# Patient Record
Sex: MALE | Race: WHITE
[De-identification: names, ages, dates, MRNs, and addresses within clinical notes are randomized per-mention and may not be internally consistent; named-entity substitution may affect disease eponyms.]

---

## 2017-02-27 ENCOUNTER — HOSPITAL ENCOUNTER (INPATIENT)
Dept: HOSPITAL 74 - YASAS | Age: 47
LOS: 4 days | Discharge: HOME | End: 2017-03-03
Attending: INTERNAL MEDICINE | Admitting: INTERNAL MEDICINE
Payer: COMMERCIAL

## 2017-02-27 VITALS — BODY MASS INDEX: 33.1 KG/M2

## 2017-02-27 DIAGNOSIS — S09.8XXA: ICD-10-CM

## 2017-02-27 DIAGNOSIS — E78.5: ICD-10-CM
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
## 2017-02-27 DIAGNOSIS — M15.9: ICD-10-CM

## 2017-02-27 DIAGNOSIS — F31.81: ICD-10-CM

## 2017-02-27 DIAGNOSIS — S30.0XXA: ICD-10-CM

## 2017-02-27 DIAGNOSIS — Z86.69: ICD-10-CM
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
## 2017-02-27 DIAGNOSIS — F10.230: Primary | ICD-10-CM

## 2017-02-27 DIAGNOSIS — F17.213: ICD-10-CM

## 2017-02-27 LAB
APPEARANCE UR: CLEAR
BILIRUB UR STRIP.AUTO-MCNC: NEGATIVE MG/DL
COLOR UR: (no result)
KETONES UR QL STRIP: NEGATIVE
LEUKOCYTE ESTERASE UR QL STRIP.AUTO: NEGATIVE
NITRITE UR QL STRIP: NEGATIVE
PH UR: 5 [PH] (ref 5–8)
PROT UR QL STRIP: NEGATIVE
PROT UR QL STRIP: NEGATIVE
RBC # UR STRIP: NEGATIVE /UL
SP GR UR: 1.02 (ref 1–1.03)
UROBILINOGEN UR STRIP-MCNC: (no result) E.U./DL (ref 0.2–1)

## 2017-02-27 RX ADMIN — ANALGESIC BALM SCH: 1.74; 4.06 OINTMENT TOPICAL at 22:28

## 2017-02-27 RX ADMIN — LIDOCAINE SCH PATCH: 50 PATCH TOPICAL at 13:44

## 2017-02-27 RX ADMIN — Medication SCH MG: at 22:28

## 2017-02-27 NOTE — CONSULT
[facility] Psychiatric Consult





- Data


Date of interview: 02/27/17


Admission source: [facility]


Identifying data: Another admission to [facility] for this 46 y/o  male 

seeking detox treatment on [address] for alcohol dependence.Patient is single,a 

father of four,domiciled,unemployed and supported on SSI benefits.


Substance Abuse History: - Smoking Cessation.  Smoking history: Current every 

day smoker.  Have you smoked in the past 12 months: Yes.  Aproximately how many 

cigarettes per day: 5.  Cigars Per Day: 0.  Hx Chewing Tobacco Use: No.  

Initiated information on smoking cessation: Yes.  'Breaking Loose' booklet given

: 02/27/17.  - Substance & Tx. History.  Hx Alcohol Use: Yes.  Substance Use 

Type: Alcohol.  Hx Substance Use Treatment: Yes.  - Substances Abused.  ** 

Alcohol-vodka/beer.  Route: Oral.  Frequency: Daily.  Amount used: 4-5 pts./1-6 

pk.  Age of first use: 12.  Date of Last Use: 02/27/17.  Confirmed by the 

patient in my interview.


Medical History: Hypercholesterolemia,arthritis and alcohol induced seizures (

not recent).Noted report of past treatment for syphilis at age 18.


Psychiatric History: Psychiatric history remains unchanged since our last 

encounter in 11/2016.History as follows : diagnosed with Bipolar Disorder;

history of two psychiatric hospitalizations ([facility] and [facility]).Onset of 

psychiatric disturbances in 2007.Mr Small gets his psychiatric outpatient 

services at [facility].Maintained on a regimen of seroquel 400 mg/hs (taken two 

days ago as per self-report).No reported history of suicide attempts.


Physical/Sexual Abuse/Trauma History: Patient denies.


Additional Comment: Urine Drug Screen Results: BZO-Benzodiazepines.Noted.





Mental Status Exam





- Mental Status Exam


Alert and Oriented to: Time, Place, Person


Cognitive Function: Grossly Intact


Patient Appearance: Well Groomed (obese)


Mood: Nervous, Withdrawn


Affect: Mood Congruent


Patient Behavior: Sedated (moderately), Fatigued


Speech Pattern: Clear, Delayed


Voice Loudness: Moderately Soft/Quiet


Thought Process: Goal Oriented


Thought Disorder: Not Present


Hallucinations: Denies


Suicidal Ideation: Denies


Homicidal Ideation: Denies


Insight/Judgement: Poor


Sleep: Fair


Appetite: Good


Muscle strength/Tone: Normal


Gait/Station: Normal





Psychiatric Findings





- Problem List (Axis 1, 2,3)


(1) Alcohol dependence with withdrawal, uncomplicated


Current Visit: Yes   Status: Chronic





(2) Nicotine dependence


Current Visit: Yes   Status: Acute   Qualifiers: 


     Nicotine product type: cigarettes     Substance use status: uncomplicated 

    Qualified Code(s): F17.210 - Nicotine dependence, cigarettes, uncomplicated

  





(3) Bipolar II disorder


Current Visit: Yes   Status: Chronic





(4) Substance induced mood disorder


Current Visit: Yes   Status: Acute





(5) Hyperlipidemia


Current Visit: Yes   Status: Chronic   Qualifiers: 


     Hyperlipidemia type: unspecified        Qualified Code(s): E78.5 - 

Hyperlipidemia, unspecified  








- Initial Treatment Plan


Initial Treatment Plan: Psychoeducation.Detoxification.Medications : zoloft 100 

mg po daily + seroquel 300 mg (reduced) po hs.Side effects/benefits discussed 

with this patient.Made aware of risk of EPS (akathisia,dystonias,dyskinesias),

neuroleptic malignant syndrome,metabolic syndrome (seroquel),sexual dysfunction,

suicidal ideation (zoloft).Patient agrees to continue care on these drugs in 

spite of explained risks.Observation.

## 2017-02-27 NOTE — HP
CIWA Score





- CIWA Score


Nausea/Vomitin-No Nausea/No Vomiting


Muscle Tremors: 4-Moderate,w/Arms Extend


Anxiety: 3


Agitation: 4-Moderately Restless


Paroxysmal Sweats: 3


Orientation: 0-Oriented


Tacttile Disturbances: 0-None


Auditory Disturbances: 0-None


Visual Disturbances: 0-None


Headache: 1-Very Mild


CIWA-Ar Total Score: 15





Admission ROS [facility]





- HPI


Chief Complaint: 


I am here for detox.


Allergies/Adverse Reactions: 


 Allergies











Allergy/AdvReac Type Severity Reaction Status Date / Time


 


No Known Allergies Allergy   Verified 17 09:51











History of Present Illness: 


pt is a 47yr old male with a long history of alcohol dependence seeking detox 

for treatment. 


Exam Limitations: Intoxication





- Ebola screening


Have you traveled outside of the country in the last 21 days: No


Have you had contact with anyone from an Ebola affected area: No


Have you been sick,other than usual withdrawal symptoms: No


Do you have a fever: No





- Review of Systems


Constitutional: Chills, Diaphoresis, Loss of Appetite, Night Sweats, Changes in 

sleep


EENT: reports: Tearing, Nose Congestion


Respiratory: reports: Cough, Productive cough (greenish/yellowish)


Cardiac: reports: Syncope


GI: reports: Nausea, Poor Appetite, Poor Fluid Intake, Indigestion


: reports: No Symptoms Reported


Musculoskeletal: reports: Back Pain


Integumentary: reports: Bruising (ecchymosis to right side of buttock d/t fall 

4 days ago), Flushing, Sweating


Neuro: reports: Headache, Seizure (last seizure three yrs ago), Tingling, 

Tremors


Endocrine: reports: Flushing, Intolerance to Cold, Intolerance to Heat


Hematology: reports: No Symptoms Reported


Psychiatric: reports: Judgement Intact, Mood/Affect Appropiate, Orientated x3, 

Agitated, Anxious


Other Systems: Reviewed and Negative





Patient History





- Patient Medical History


Hx Anemia: No


Hx Asthma: No


Hx Chronic Obstructive Pulmonary Disease (COPD): No


Hx Cancer: No


Hx Cardiac Disorders: No


Hx Congestive Heart Failure: No


Hx Hypertension: No


Hx Hypercholesterolemia: Yes (NO MEDS)


Hx Pacemaker: No


HX Cerebrovascular Accident: No


Hx Seizures: Yes (alcohol related-last episode was in )


Hx Dementia: No


Hx Diabetes: No


Hx Gastrointestinal Disorders: No


Hx Liver Disease: No


Hx Genitourinary Disorders: No


Hx Sexually Transmitted Disorders: Yes (syphilis at age 18)


Hx Renal Disease (ESRD): No


Hx Thyroid Disease: No


Hx Human Immunodeficiency Virus (HIV): No (NEGATIVE HX  last)


Hx Hepatitis C: No


Hx Depression: Yes


Hx Suicide Attempt: No


Hx Bipolar Disorder: Yes (on Seroquel and Zoloft as per Hx.)


Hx Schizophrenia: No





- Patient Surgical History


Past Surgical History: No


Hx Neurologic Surgery: No


Hx Cataract Extraction: No


Hx Cardiac Surgery: No


Hx Lung Surgery: No


Hx Breast Surgery: No


Hx Breast Biopsy: No


Hx Abdominal Surgery: No


Hx Appendectomy: No


Hx Cholecystectomy: No


Hx Genitourinary Surgery: No


Hx  Section: No


Hx Orthopedic Surgery: No


Anesthesia Reaction: No





- PPD History


Previous Implant?: Yes


Documented Results: Positive w/o proof


Results: CXR neg 


PPD to be Administered?: No





- Reproductive History


Patient is a Female of Child Bearing Age (11 -55 yrs old): No





- Smoking Cessation


Smoking history: Current every day smoker


Have you smoked in the past 12 months: Yes


Aproximately how many cigarettes per day: 5


Cigars Per Day: 0


Hx Chewing Tobacco Use: No


Initiated information on smoking cessation: Yes


'Breaking Loose' booklet given: 17





- Substance & Tx. History


Hx Alcohol Use: Yes


Substance Use Type: Alcohol


Hx Substance Use Treatment: Yes





- Substances Abused


  ** Alcohol-vodka/beer


Route: Oral


Frequency: Daily


Amount used: 4-5 pts./1-6 pk.


Age of first use: 12


Date of Last Use: 17





Family Disease History





- Family Disease History


Family Disease History: Diabetes: Mother (HTN), Other: Father (alcohol,)

, Mother





Admission Physical Exam [facility]





- Vital Signs


Vital Signs: 


 Vital Signs - 24 hr











  17





  09:58


 


Temperature 97 F L


 


Pulse Rate 112 H


 


Respiratory 19





Rate 


 


Blood Pressure 136/86














- Physical


General Appearance: Yes: Appropriately Dressed, Moderate Distress, Tremorous, 

Irritable, Sweating, Anxious


HEENTM: Yes: Hearing grossly Normal, Nasal Congestion, Rhinorrhea


Respiratory: Yes: Lungs Clear, Normal Breath Sounds, No Respiratory Distress


Neck: Yes: No masses,lesions,Nodules


Breast: Yes: Within Normal Limits


Cardiology: Yes: Regular Rhythm, Regular Rate, S1, S2


Abdominal: Yes: Normal Bowel Sounds


Genitourinary: Yes: Within Normal Limits


Back: Yes: Normal Inspection


Musculoskeletal: Yes: Back pain


Extremities: Yes: Normal Inspection, Tremors


Neurological: Yes: Fully Oriented, Alert, Normal Response


Integumentary: Yes: Diaphoresis, Other (bruising with eccymosis to right side 

of buttock d/t fall)


Lymphatic: Yes: Within Normal Limits





- Diagnostic


(1) Alcohol dependence with withdrawal, uncomplicated


Current Visit: Yes   Status: Chronic





(2) Nicotine dependence


Current Visit: Yes   Status: Chronic   Qualifiers: 


     Nicotine product type: cigarettes     Substance use status: uncomplicated 

    Qualified Code(s): F17.210 - Nicotine dependence, cigarettes, uncomplicated

  





(3) Alcohol related seizure


Current Visit: No   Status: Inactive





(4) Hyperlipidemia


Current Visit: Yes   Status: Chronic   Qualifiers: 


     Hyperlipidemia type: unspecified        Qualified Code(s): E78.5 - 

Hyperlipidemia, unspecified  








Cleared for Admission [facility]





- Detox or Rehab


[facility] Level of Care: Medically Managed


Detox Regimen/Protocol: Librium





[facility] Breath Alcohol Content


Breath Alcohol Content: 0.189





Urine Drug Screen





- Results


Drug Screen Negative: No


Urine Drug Screen Results: BZO-Benzodiazepines

## 2017-02-28 LAB
ALBUMIN SERPL-MCNC: 3.7 G/DL (ref 3.4–5)
ALP SERPL-CCNC: 94 U/L (ref 45–117)
ALT SERPL-CCNC: 79 U/L (ref 12–78)
ANION GAP SERPL CALC-SCNC: 8 MMOL/L (ref 8–16)
AST SERPL-CCNC: 85 U/L (ref 15–37)
BILIRUB SERPL-MCNC: 0.6 MG/DL (ref 0.2–1)
CALCIUM SERPL-MCNC: 8.2 MG/DL (ref 8.5–10.1)
CO2 SERPL-SCNC: 27 MMOL/L (ref 21–32)
CREAT SERPL-MCNC: 0.8 MG/DL (ref 0.7–1.3)
DEPRECATED RDW RBC AUTO: 13.8 % (ref 11.9–15.9)
GLUCOSE SERPL-MCNC: 134 MG/DL (ref 74–106)
MCH RBC QN AUTO: 35 PG (ref 25.7–33.7)
MCHC RBC AUTO-ENTMCNC: 33.5 G/DL (ref 32–35.9)
MCV RBC: 104.4 FL (ref 80–96)
PLATELET # BLD AUTO: 231 K/MM3 (ref 134–434)
PMV BLD: 8.1 FL (ref 7.5–11.1)
PROT SERPL-MCNC: 7.1 G/DL (ref 6.4–8.2)
WBC # BLD AUTO: 6.4 K/MM3 (ref 4–10)

## 2017-02-28 RX ADMIN — SERTRALINE HYDROCHLORIDE SCH MG: 50 TABLET ORAL at 10:27

## 2017-02-28 RX ADMIN — Medication SCH TAB: at 10:27

## 2017-02-28 RX ADMIN — Medication SCH MG: at 22:32

## 2017-02-28 RX ADMIN — GUAIFENESIN AND DEXTROMETHORPHAN PRN ML: 100; 10 SYRUP ORAL at 00:57

## 2017-02-28 RX ADMIN — ANALGESIC BALM SCH: 1.74; 4.06 OINTMENT TOPICAL at 22:33

## 2017-02-28 RX ADMIN — ANALGESIC BALM SCH APPLIC: 1.74; 4.06 OINTMENT TOPICAL at 10:27

## 2017-02-28 RX ADMIN — LIDOCAINE SCH PATCH: 50 PATCH TOPICAL at 10:26

## 2017-02-28 RX ADMIN — GUAIFENESIN AND DEXTROMETHORPHAN PRN ML: 100; 10 SYRUP ORAL at 17:34

## 2017-02-28 NOTE — EKG
Test Reason : 

Blood Pressure : ***/*** mmHG

Vent. Rate : 090 BPM     Atrial Rate : 090 BPM

   P-R Int : 182 ms          QRS Dur : 090 ms

    QT Int : 380 ms       P-R-T Axes : 041 029 021 degrees

   QTc Int : 464 ms

 

NORMAL SINUS RHYTHM

NORMAL ECG

WHEN COMPARED WITH ECG OF 15-APR-2016 10:50,

NO SIGNIFICANT CHANGE WAS FOUND

Confirmed by LILIA NAGEL MD ([id number]) on 2/28/2017 3:35:17 PM

 

Referred By: Mono Wing           Confirmed By:LILIA NAGEL MD

## 2017-02-28 NOTE — PN
[facility] CIWA





- CIWA Score


Nausea/Vomitin-No Nausea/No Vomiting


Muscle Tremors: 4-Moderate,w/Arms Extend


Anxiety: 4-Mod. Anxious/Guarded


Agitation: 4-Moderately Restless


Paroxysmal Sweats: 1-Minimal Palms Moist


Orientation: 0-Oriented


Tacttile Disturbances: 3-Moderate Itch/Numb/Burn


Auditory Disturbances: 0-None


Visual Disturbances: 0-None


Headache: 0-None Present


CIWA-Ar Total Score: 16





[facility] Progress Note (SOAP)


Subjective: 


ANXIETY,TREMORS,SWEATS,INTERMITTENT SLEEP


Objective: 





17 10:45


 Vital Signs











Temperature  97 F L  17 09:58


 


Pulse Rate  110 H  17 09:58


 


Respiratory Rate  18   17 09:58


 


Blood Pressure  127/85   17 09:58


 


O2 Sat by Pulse Oximetry (%)      








 Laboratory Last Values











WBC  6.4 K/mm3 (4.0-10.0)  D 17  06:00    


 


RBC  3.33 M/mm3 (4.00-5.60)  L D 17  06:00    


 


Hgb  11.6 GM/dL (11.7-16.9)  L D 17  06:00    


 


Hct  34.7 % (35.4-49)  L D 17  06:00    


 


MCV  104.4 fl (80-96)  H  17  06:00    


 


MCHC  33.5 g/dl (32.0-35.9)   17  06:00    


 


RDW  13.8 % (11.9-15.9)   17  06:00    


 


Plt Count  231 K/MM3 (134-434)  D 17  06:00    


 


MPV  8.1 fl (7.5-11.1)   17  06:00    


 


Sodium  140 mmol/L (136-145)   17  06:00    


 


Potassium  3.6 mmol/L (3.5-5.1)   17  06:00    


 


Chloride  105 mmol/L ()   17  06:00    


 


Carbon Dioxide  27 mmol/L (21-32)   17  06:00    


 


Anion Gap  8  (8-16)   17  06:00    


 


BUN  7 mg/dL (7-18)  D 17  06:00    


 


Creatinine  0.8 mg/dL (0.7-1.3)   17  06:00    


 


Creat Clearance w eGFR  > 60  (>60)   17  06:00    


 


Random Glucose  134 mg/dL ()  H  17  06:00    


 


Calcium  8.2 mg/dL (8.5-10.1)  L  17  06:00    


 


Total Bilirubin  0.6 mg/dL (0.2-1.0)  D 17  06:00    


 


AST  85 U/L (15-37)  H D 17  06:00    


 


ALT  79 U/L (12-78)  H D 17  06:00    


 


Alkaline Phosphatase  94 U/L ()   17  06:00    


 


Total Protein  7.1 g/dl (6.4-8.2)   17  06:00    


 


Albumin  3.7 g/dl (3.4-5.0)   17  06:00    


 


Urine Color  Caitlyn   17  13:55    


 


Urine Appearance  Clear   17  13:55    


 


Urine pH  5.0  (5.0-8.0)   17  13:55    


 


Ur Specific Gravity  1.024  (1.001-1.035)   17  13:55    


 


Urine Protein  Negative  (NEGATIVE)   17  13:55    


 


Urine Glucose (UA)  Negative  (NEGATIVE)   17  13:55    


 


Urine Ketones  Negative  (NEGATIVE)   17  13:55    


 


Urine Blood  Negative  (NEGATIVE)   17  13:55    


 


Urine Nitrite  Negative  (NEGATIVE)   17  13:55    


 


Urine Bilirubin  Negative  (NEGATIVE)   17  13:55    


 


Urine Urobilinogen  4.0 e.u/dl E.U./dl (0.2-1.0)   17  13:55    


 


Ur Leukocyte Esterase  Negative  (NEGATIVE)   17  13:55    


 


Hepatitis C Antibody  <0.1 s/co ratio (0.0-0.9)   17  12:00    











Assessment: 





17 10:45


WITHDRAWAL SX


Plan: 


CONTINUE DETOX

## 2017-03-01 RX ADMIN — Medication SCH TAB: at 10:33

## 2017-03-01 RX ADMIN — Medication SCH MG: at 22:31

## 2017-03-01 RX ADMIN — ANALGESIC BALM SCH: 1.74; 4.06 OINTMENT TOPICAL at 10:33

## 2017-03-01 RX ADMIN — SERTRALINE HYDROCHLORIDE SCH MG: 50 TABLET ORAL at 10:33

## 2017-03-01 RX ADMIN — LIDOCAINE SCH PATCH: 50 PATCH TOPICAL at 10:34

## 2017-03-01 RX ADMIN — ANALGESIC BALM SCH: 1.74; 4.06 OINTMENT TOPICAL at 22:34

## 2017-03-01 NOTE — PN
[facility] CIWA





- CIWA Score


Nausea/Vomitin-No Nausea/No Vomiting


Muscle Tremors: 4-Moderate,w/Arms Extend


Anxiety: 4-Mod. Anxious/Guarded


Agitation: 4-Moderately Restless


Paroxysmal Sweats: 1-Minimal Palms Moist


Orientation: 0-Oriented


Tacttile Disturbances: 3-Moderate Itch/Numb/Burn


Auditory Disturbances: 0-None


Visual Disturbances: 0-None


Headache: 0-None Present


CIWA-Ar Total Score: 16





[facility] Progress Note (SOAP)


Subjective: 


ANXIETY,SWEATS,FATIGUE.


Objective: 





17 11:54


 Vital Signs











Temperature  98.3 F   17 09:48


 


Pulse Rate  89   17 09:48


 


Respiratory Rate  18   17 09:48


 


Blood Pressure  123/93   17 09:48


 


O2 Sat by Pulse Oximetry (%)      








 Laboratory Last Values











WBC  6.4 K/mm3 (4.0-10.0)  D 17  06:00    


 


RBC  3.33 M/mm3 (4.00-5.60)  L D 17  06:00    


 


Hgb  11.6 GM/dL (11.7-16.9)  L D 17  06:00    


 


Hct  34.7 % (35.4-49)  L D 17  06:00    


 


MCV  104.4 fl (80-96)  H  17  06:00    


 


MCHC  33.5 g/dl (32.0-35.9)   17  06:00    


 


RDW  13.8 % (11.9-15.9)   17  06:00    


 


Plt Count  231 K/MM3 (134-434)  D 17  06:00    


 


MPV  8.1 fl (7.5-11.1)   17  06:00    


 


Sodium  140 mmol/L (136-145)   17  06:00    


 


Potassium  3.6 mmol/L (3.5-5.1)   17  06:00    


 


Chloride  105 mmol/L ()   17  06:00    


 


Carbon Dioxide  27 mmol/L (21-32)   17  06:00    


 


Anion Gap  8  (8-16)   17  06:00    


 


BUN  7 mg/dL (7-18)  D 17  06:00    


 


Creatinine  0.8 mg/dL (0.7-1.3)   17  06:00    


 


Creat Clearance w eGFR  > 60  (>60)   17  06:00    


 


Random Glucose  134 mg/dL ()  H  17  06:00    


 


Calcium  8.2 mg/dL (8.5-10.1)  L  17  06:00    


 


Total Bilirubin  0.6 mg/dL (0.2-1.0)  D 17  06:00    


 


AST  85 U/L (15-37)  H D 17  06:00    


 


ALT  79 U/L (12-78)  H D 17  06:00    


 


Alkaline Phosphatase  94 U/L ()   17  06:00    


 


Total Protein  7.1 g/dl (6.4-8.2)   17  06:00    


 


Albumin  3.7 g/dl (3.4-5.0)   17  06:00    


 


Urine Color  Caitlyn   17  13:55    


 


Urine Appearance  Clear   17  13:55    


 


Urine pH  5.0  (5.0-8.0)   17  13:55    


 


Ur Specific Gravity  1.024  (1.001-1.035)   17  13:55    


 


Urine Protein  Negative  (NEGATIVE)   17  13:55    


 


Urine Glucose (UA)  Negative  (NEGATIVE)   17  13:55    


 


Urine Ketones  Negative  (NEGATIVE)   17  13:55    


 


Urine Blood  Negative  (NEGATIVE)   17  13:55    


 


Urine Nitrite  Negative  (NEGATIVE)   17  13:55    


 


Urine Bilirubin  Negative  (NEGATIVE)   17  13:55    


 


Urine Urobilinogen  4.0 e.u/dl E.U./dl (0.2-1.0)   17  13:55    


 


Ur Leukocyte Esterase  Negative  (NEGATIVE)   17  13:55    


 


RPR Titer  Nonreactive  (NONREACTIVE)   17  06:00    


 


Hepatitis C Antibody  <0.1 s/co ratio (0.0-0.9)   17  12:00    











Assessment: 





17 11:55


WITHDRAWAL SX


Plan: 


CONTINUE DETOX

## 2017-03-02 RX ADMIN — Medication SCH TAB: at 10:28

## 2017-03-02 RX ADMIN — ANALGESIC BALM SCH: 1.74; 4.06 OINTMENT TOPICAL at 22:26

## 2017-03-02 RX ADMIN — ANALGESIC BALM SCH: 1.74; 4.06 OINTMENT TOPICAL at 10:28

## 2017-03-02 RX ADMIN — Medication SCH MG: at 22:26

## 2017-03-02 RX ADMIN — SERTRALINE HYDROCHLORIDE SCH MG: 50 TABLET ORAL at 10:28

## 2017-03-02 RX ADMIN — LIDOCAINE SCH PATCH: 50 PATCH TOPICAL at 10:29

## 2017-03-02 RX ADMIN — GUAIFENESIN AND DEXTROMETHORPHAN PRN ML: 100; 10 SYRUP ORAL at 20:11

## 2017-03-02 NOTE — PN
[facility] Progress Note (SOAP)


Subjective: 


DECREASED ANXIETY,SWEATS.DETOX PROCEEDING WELL.


Objective: 





03/02/17 10:02


 Vital Signs











Temperature  95.7 F L  03/02/17 06:11


 


Pulse Rate  67   03/02/17 09:19


 


Respiratory Rate  18   03/02/17 09:19


 


Blood Pressure  123/88   03/02/17 09:19


 


O2 Sat by Pulse Oximetry (%)      











Assessment: 





03/02/17 10:02


DECREASED WITHDRAWAL SX


Plan: 


CONTINUE DETOX.

## 2017-03-03 VITALS — DIASTOLIC BLOOD PRESSURE: 70 MMHG | HEART RATE: 85 BPM | TEMPERATURE: 96 F | SYSTOLIC BLOOD PRESSURE: 105 MMHG

## 2017-03-03 PROCEDURE — HZ2ZZZZ DETOXIFICATION SERVICES FOR SUBSTANCE ABUSE TREATMENT: ICD-10-PCS | Performed by: INTERNAL MEDICINE

## 2017-03-03 RX ADMIN — SERTRALINE HYDROCHLORIDE SCH: 50 TABLET ORAL at 10:07

## 2017-03-03 RX ADMIN — LIDOCAINE SCH: 50 PATCH TOPICAL at 10:07

## 2017-03-03 RX ADMIN — ANALGESIC BALM SCH: 1.74; 4.06 OINTMENT TOPICAL at 10:07

## 2017-03-03 RX ADMIN — Medication SCH: at 10:07

## 2017-03-03 NOTE — DS
[facility] Detox Discharge Summary


Admission Date: 


02/27/17





Discharge Date: 03/03/17





- History


Present History: Alcohol Dependence


Additional Comments: 


DETOX COMPLETED.ALERT O X 3. NAD. FOLLOW UP WITH REHAB REFERRAL/PMD FOR MEDICAL 

MANAGEMENT. 


Pertinent Past History: 


HYPERCHOLESTEROLEMIA


SEIZURE DISORDER


DEPRESSION





- Physical Exam Results


Vital Signs: 


 Vital Signs











Temperature  96.0 F L  03/03/17 06:29


 


Pulse Rate  85   03/03/17 06:29


 


Respiratory Rate  18   03/03/17 06:29


 


Blood Pressure  105/70   03/03/17 06:29


 


O2 Sat by Pulse Oximetry (%)      











Pertinent Admission Physical Exam Findings: 


WITHDRAWAL SX





- Treatment


Hospital Course: Detox Protocol Followed, Detoxed Safely, Responded well, 

Discharged Condition Good





- Medication


Discharge Medications: 


Ambulatory Orders





Sertraline HCl [Zoloft -] 100 mg PO DAILY #30 tablet 04/16/16 


Quetiapine Fumarate [Seroquel -] 400 mg PO HS #30 tablet 06/30/16 


Quetiapine Fumarate [Seroquel -] 400 mg PO HS #30 tab 02/27/17 


Sertraline HCl [Zoloft -] 100 mg PO DAILY #30 tablet 02/27/17 











- Diagnosis


(1) Head injury


Status: Resolved   Qualifiers: 


     Qualified Code(s): S09.90XA - Unspecified injury of head, initial 

encounter  





(2) Nicotine dependence


Status: Acute   Qualifiers: 


     Nicotine product type: cigarettes     Substance use status: in withdrawal 

       Qualified Code(s): F17.213 - Nicotine dependence, cigarettes, with 

withdrawal  





(3) Alcohol dependence with withdrawal, uncomplicated


Status: Acute





(4) Arthritis


Status: Chronic





(5) Hyperlipidemia


Status: Chronic   Qualifiers: 


     Hyperlipidemia type: unspecified        Qualified Code(s): E78.5 - 

Hyperlipidemia, unspecified  





(6) Seizure


Status: Suspected   








- AMA


Did Patient Leave Against Medical Advice: No

## 2017-05-24 ENCOUNTER — HOSPITAL ENCOUNTER (INPATIENT)
Dept: HOSPITAL 74 - YASAS | Age: 47
LOS: 4 days | Discharge: HOME | End: 2017-05-28
Attending: INTERNAL MEDICINE | Admitting: INTERNAL MEDICINE
Payer: COMMERCIAL

## 2017-05-24 VITALS — BODY MASS INDEX: 31.4 KG/M2

## 2017-05-24 DIAGNOSIS — F19.282: ICD-10-CM

## 2017-05-24 DIAGNOSIS — Z87.438: ICD-10-CM

## 2017-05-24 DIAGNOSIS — Z86.69: ICD-10-CM

## 2017-05-24 DIAGNOSIS — D64.9: ICD-10-CM

## 2017-05-24 DIAGNOSIS — E78.5: ICD-10-CM

## 2017-05-24 DIAGNOSIS — M19.90: ICD-10-CM

## 2017-05-24 DIAGNOSIS — F31.81: ICD-10-CM

## 2017-05-24 DIAGNOSIS — F19.24: ICD-10-CM

## 2017-05-24 DIAGNOSIS — F10.230: Primary | ICD-10-CM

## 2017-05-24 DIAGNOSIS — F17.213: ICD-10-CM

## 2017-05-24 DIAGNOSIS — E66.9: ICD-10-CM

## 2017-05-24 DIAGNOSIS — G47.00: ICD-10-CM

## 2017-05-24 DIAGNOSIS — I10: ICD-10-CM

## 2017-05-24 LAB
APPEARANCE UR: CLEAR
BILIRUB UR STRIP.AUTO-MCNC: NEGATIVE MG/DL
COLOR UR: (no result)
KETONES UR QL STRIP: NEGATIVE
LEUKOCYTE ESTERASE UR QL STRIP.AUTO: NEGATIVE
NITRITE UR QL STRIP: NEGATIVE
PH UR: 5 [PH] (ref 5–8)
PROT UR QL STRIP: NEGATIVE
PROT UR QL STRIP: NEGATIVE
RBC # UR STRIP: NEGATIVE /UL
SP GR UR: 1.02 (ref 1–1.02)
UROBILINOGEN UR STRIP-MCNC: NEGATIVE E.U./DL (ref 0.2–1)

## 2017-05-24 PROCEDURE — HZ2ZZZZ DETOXIFICATION SERVICES FOR SUBSTANCE ABUSE TREATMENT: ICD-10-PCS | Performed by: INTERNAL MEDICINE

## 2017-05-24 RX ADMIN — Medication SCH MG: at 22:13

## 2017-05-24 RX ADMIN — NICOTINE SCH: 14 PATCH, EXTENDED RELEASE TRANSDERMAL at 15:45

## 2017-05-24 NOTE — HP
CIWA Score





- CIWA Score


Nausea/Vomitin


Muscle Tremors: 4-Moderate,w/Arms Extend


Anxiety: 4-Mod. Anxious/Guarded


Agitation: 4-Moderately Restless


Paroxysmal Sweats: 1-Minimal Palms Moist


Orientation: 0-Oriented


Tacttile Disturbances: 3-Moderate Itch/Numb/Burn


Auditory Disturbances: 0-None


Visual Disturbances: 0-None


Headache: 1-Very Mild


CIWA-Ar Total Score: 22





Admission ROS [facility]





- HPI


Chief Complaint: 


DETOX TX FOR ALCOHOL DEPENDENCE/ACUT ALCOHOL INTOXICATION


Allergies/Adverse Reactions: 


 Allergies











Allergy/AdvReac Type Severity Reaction Status Date / Time


 


No Known Allergies Allergy   Verified 17 10:27











History of Present Illness: 


48 Y/O H/M WITH A HX OF ALCOHOL DEPENDENCE SEEKING DETOX TX. PT STATES HE WAS 

ON [facility] LAST NIGHT FOR ALCOHOL INTOXICATION, DISCHARGED THIS 

MORNING AND REFERRED TO DETOX/REHAB.


Exam Limitations: No Limitations





- Ebola screening


Have you traveled outside of the country in the last 21 days: No


Have you had contact with anyone from an Ebola affected area: No


Have you been sick,other than usual withdrawal symptoms: No





- Review of Systems


Constitutional: Loss of Appetite, Night Sweats, Changes in sleep


EENT: reports: Blurred Vision, Tearing, Nose Congestion, Dental Problems (

MISSING TEETH)


Respiratory: reports: No Symptoms reported


Cardiac: reports: Lightheadedness


GI: reports: Diarrhea, Nausea, Poor Appetite, Poor Fluid Intake, Vomiting, 

Indigestion, Abdominal cramping


: reports: No Symptoms Reported


Musculoskeletal: reports: Muscle Pain, Other (ARTHRITIS KNEES)


Integumentary: reports: No Symptoms Reported


Neuro: reports: Headache, Seizure (DUE TO ALCOHOL WITHDRAWALS), Tremors, 

Unsteady Gait, Dizziness


Endocrine: reports: No Symptoms Reported


Hematology: reports: Anemia


Psychiatric: reports: Orientated x3, Agitated, Anxious, Depressed


Other Systems: Reviewed and Negative





Patient History





- Patient Medical History


Hx Anemia: Yes (NO CURRENT MED)


Hx Asthma: No


Hx Chronic Obstructive Pulmonary Disease (COPD): No


Hx Cancer: No


Hx Cardiac Disorders: No


Hx Congestive Heart Failure: No


Hx Hypertension: Yes (NO CURRENT MED. LISINOPRIL 5 MG IN 2016 ON PHARM REC.)


Hx Hypercholesterolemia: Yes (NO MEDS)


Hx Pacemaker: No


HX Cerebrovascular Accident: No


Hx Seizures: Yes (alcohol related-last episode was in )


Hx Dementia: No


Hx Diabetes: No


Hx Gastrointestinal Disorders: No


Hx Liver Disease: No


Hx Genitourinary Disorders: No


Hx Sexually Transmitted Disorders: Yes (syphilis)


Hx Renal Disease (ESRD): No


Hx Thyroid Disease: No


Hx Human Immunodeficiency Virus (HIV): No (NEGATIVE HX )


Hx Hepatitis C: No


Hx Depression: Yes


Hx Suicide Attempt: No (DENIES)


Hx Bipolar Disorder: Yes (on Seroquel and Zoloft as per Hx.)


Hx Schizophrenia: No





- Patient Surgical History


Past Surgical History: No


Hx Neurologic Surgery: No


Hx Cataract Extraction: No


Hx Cardiac Surgery: No


Hx Lung Surgery: No


Hx Breast Surgery: No


Hx Breast Biopsy: No


Hx Abdominal Surgery: No


Hx Appendectomy: No


Hx Cholecystectomy: No


Hx Genitourinary Surgery: No


Hx Orthopedic Surgery: No


Anesthesia Reaction: No





- PPD History


Previous Implant?: Yes


Documented Results: Positive w/o proof


Results: CXR neg 


PPD to be Administered?: No





- Reproductive History


Patient is a Female of Child Bearing Age (11 -55 yrs old): No (MALE)





- Smoking Cessation


Smoking history: Current every day smoker


Have you smoked in the past 12 months: Yes


Aproximately how many cigarettes per day: 9


Cigars Per Day: 0


Hx Chewing Tobacco Use: No


Initiated information on smoking cessation: Yes


'Breaking Loose' booklet given: 17





- Substance & Tx. History


Hx Alcohol Use: Yes (VODKA)


Hx Substance Use: No (DECLINED)


Substance Use Type: Alcohol


Hx Substance Use Treatment: Yes ([facility]-DETOX)





- Substances Abused


  ** Alcohol-beer/vodka


Route: Oral


Frequency: Daily


Amount used: 7 (16 oz.)/2-3 pts.


Age of first use: 12


Date of Last Use: 17





Family Disease History





- Family Disease History


Family Disease History: Diabetes: Mother (HTN), Other: Father (alcohol,)

, Mother





Admission Physical Exam [facility]





- Vital Signs


Vital Signs: 


 Vital Signs - 24 hr











  17





  09:55


 


Temperature 97.9 F


 


Pulse Rate 91 H


 


Respiratory 20





Rate 


 


Blood Pressure 123/83














- Physical


General Appearance: Yes: Alcohol on Breath, Intoxicated, Obese, Anxious


HEENTM: Yes: EOMI, Normocephalic, [place], Pharynx Normal, Nasal Congestion, 

Rhinorrhea


Respiratory: Yes: Chest Non-Tender, Lungs Clear, Normal Breath Sounds, No 

Respiratory Distress


Neck: Yes: Supple, Trachea in good position


Breast: Yes: Breast Exam Deferred


Cardiology: Yes: Regular Rhythm, Regular Rate, S1, S2


Abdominal: Yes: Normal Bowel Sounds, Non Tender, Soft


Genitourinary: Yes: Other (N/C)


Back: Yes: Within Normal Limits


Musculoskeletal: Yes: full range of Motion, Gait Steady


Extremities: Yes: Normal Range of Motion, Non-Tender, Tremors, Other (BRUISE ON 

OUTER ASPECT LOWER LEFT LEG DUE TO FALL)


Neurological: Yes: CNs II-XII NML intact, Fully Oriented, Alert, Motor Strength 

5/5


Integumentary: Yes: Dry, Warm


Lymphatic: Yes: Within Normal Limits





- Diagnostic


(1) Alcohol dependence with withdrawal, uncomplicated


Current Visit: Yes   Status: Acute





(2) Nicotine dependence


Current Visit: Yes   Status: Chronic   Qualifiers: 


     Nicotine product type: cigarettes     Substance use status: in withdrawal 

       Qualified Code(s): F17.213 - Nicotine dependence, cigarettes, with 

withdrawal  





(3) Arthritis


Current Visit: Yes   Status: Chronic


Comment: BOTH KNEES AND LEFT WRIST








(4) Seizure


Current Visit: Yes   Status: Suspected   Qualifiers: 


     Convulsion type: unspecified        Qualified Code(s): R56.9 - Unspecified 

convulsions  





(5) Hyperlipidemia


Current Visit: Yes   Status: Suspected   Qualifiers: 


     Hyperlipidemia type: unspecified        Qualified Code(s): E78.5 - 

Hyperlipidemia, unspecified  


Comment: NO CURRENT MED











Cleared for Admission [facility]





- Detox or Rehab


[facility] Level of Care: Medically Managed


Detox Regimen/Protocol: Librium





BHS Breath Alcohol Content


Breath Alcohol Content: 0.245





Urine Drug Screen





- Results


Drug Screen Negative: No


Urine Drug Screen Results: BZO-Benzodiazepines

## 2017-05-24 NOTE — CONSULT
[facility] Psychiatric Consult





- Data


Date of interview: 05/24/17


Admission source: [facility]


Identifying data: Readmission to [facility] for this 46 y/o  male 

seeking detox treatment on [address] for alcohol dependence.Patient is single,a 

father of four,domiciled,unemployed and supported on SSI benefits.


Substance Abuse History: - Smoking Cessation.  Smoking history: Current every 

day smoker.  Have you smoked in the past 12 months: Yes.  Aproximately how many 

cigarettes per day: 9.  Cigars Per Day: 0.  Hx Chewing Tobacco Use: No.  

Initiated information on smoking cessation: Yes.  'Breaking Loose' booklet given

: 05/24/17.  - Substance & Tx. History.  Hx Alcohol Use: Yes (VODKA).  Hx 

Substance Use: No (DECLINED).  Substance Use Type: Alcohol.  Hx Substance Use 

Treatment: Yes ([facility]-DETOX).  - Substances Abused.  ** Alcohol-beer/vodka.  

Route: Oral.  Frequency: Daily.  Amount used: 7 (16 oz.)/2-3 pts.  Age of first 

use: 12.  Date of Last Use: 05/23/17.  Confirmed by patient.


Medical History: Hypercholesterolemia,arthritis and alcohol induced seizures (

not recent).Noted report of past treatment for syphilis (age 18).


Psychiatric History: Diagnosed with Bipolar Disorder;history of two psychiatric 

hospitalizations ([facility] and [facility]).Onset of 

psychiatric disturbances in 2007.Mr Small still gets his psychiatric 

outpatient services at [facility].Maintained on a regimen of seroquel 400 mg/hs (

taken a week ago as per self-report).No reported history of suicide attempts.


Physical/Sexual Abuse/Trauma History: Patient denies.


Additional Comment: Urine Drug Screen Results: BZO-Benzodiazepines.Noted.





Mental Status Exam





- Mental Status Exam


Alert and Oriented to: Time, Place, Person


Cognitive Function: Good


Patient Appearance: Well Groomed


Mood: Nervous, Withdrawn, Anxious


Affect: Mood Congruent, Constricted


Patient Behavior: Fatigued, Cooperative


Speech Pattern: Clear (english-speaking)


Voice Loudness: Normal


Thought Process: Goal Oriented


Thought Disorder: Not Present


Hallucinations: Denies


Suicidal Ideation: Denies


Homicidal Ideation: Denies


Insight/Judgement: Poor


Sleep: Poorly, Difficulty falling asleep


Appetite: Good


Muscle strength/Tone: Normal


Gait/Station: Normal





Psychiatric Findings





- Problem List (Axis 1, 2,3)


(1) Alcohol dependence with withdrawal, uncomplicated


Current Visit: Yes   Status: Acute





(2) Nicotine dependence


Current Visit: Yes   Status: Acute   Qualifiers: 


     Nicotine product type: cigarettes     Substance use status: in withdrawal 

       Qualified Code(s): F17.213 - Nicotine dependence, cigarettes, with 

withdrawal  





(3) Substance induced mood disorder


Current Visit: Yes   Status: Acute





(4) Bipolar II disorder


Current Visit: Yes   Status: Chronic





(5) Arthritis


Current Visit: Yes   Status: Chronic


Comment: BOTH KNEES AND LEFT WRIST








(6) Hyperlipidemia


Current Visit: Yes   Status: Suspected   Qualifiers: 


     Hyperlipidemia type: unspecified        Qualified Code(s): E78.5 - 

Hyperlipidemia, unspecified  


Comment: NO CURRENT MED








(7) Insomnia


Current Visit: Yes   Status: Acute   








- Initial Treatment Plan


Initial Treatment Plan: Psychoeducation.Detoxification in progress.Seroquel 200 

mg po hs (intentionally reduced in view of self-report of non-adherence for one 

week + current polypharmacy).Will titrate back to 400 mg/hs in next 24 hours if 

dose is well tolerated (absence of oversedation/orthostatic hypotension)

.Patient is informed of this strategy.Mr Small is in agreement with 

careplan.Side effects/benefits of seroquel are discussed with the 

patient.Observation.

## 2017-05-25 LAB
ALBUMIN SERPL-MCNC: 3.7 G/DL (ref 3.4–5)
ALP SERPL-CCNC: 92 U/L (ref 45–117)
ALT SERPL-CCNC: 60 U/L (ref 12–78)
ANION GAP SERPL CALC-SCNC: 12 MMOL/L (ref 8–16)
AST SERPL-CCNC: 64 U/L (ref 15–37)
BILIRUB SERPL-MCNC: 0.2 MG/DL (ref 0.2–1)
CALCIUM SERPL-MCNC: 8.6 MG/DL (ref 8.5–10.1)
CO2 SERPL-SCNC: 22 MMOL/L (ref 21–32)
COCKROFT - GAULT: 136.92
CREAT SERPL-MCNC: 0.8 MG/DL (ref 0.7–1.3)
DEPRECATED RDW RBC AUTO: 14.4 % (ref 11.9–15.9)
GLUCOSE SERPL-MCNC: 123 MG/DL (ref 74–106)
MCH RBC QN AUTO: 35.6 PG (ref 25.7–33.7)
MCHC RBC AUTO-ENTMCNC: 34.3 G/DL (ref 32–35.9)
MCV RBC: 104 FL (ref 80–96)
PLATELET # BLD AUTO: 571 K/MM3 (ref 134–434)
PMV BLD: 8 FL (ref 7.5–11.1)
PROT SERPL-MCNC: 7.6 G/DL (ref 6.4–8.2)
WBC # BLD AUTO: 10.3 K/MM3 (ref 4–10)

## 2017-05-25 RX ADMIN — NICOTINE SCH: 14 PATCH, EXTENDED RELEASE TRANSDERMAL at 10:27

## 2017-05-25 RX ADMIN — HYDROXYZINE PAMOATE PRN MG: 50 CAPSULE ORAL at 19:06

## 2017-05-25 RX ADMIN — Medication SCH TAB: at 10:27

## 2017-05-25 RX ADMIN — HYDROXYZINE PAMOATE PRN MG: 50 CAPSULE ORAL at 15:10

## 2017-05-25 RX ADMIN — Medication SCH MG: at 22:32

## 2017-05-25 NOTE — EKG
Test Reason : 

Blood Pressure : ***/*** mmHG

Vent. Rate : 095 BPM     Atrial Rate : 095 BPM

   P-R Int : 180 ms          QRS Dur : 090 ms

    QT Int : 370 ms       P-R-T Axes : 036 029 025 degrees

   QTc Int : 464 ms

 

NORMAL SINUS RHYTHM

NORMAL ECG

WHEN COMPARED WITH ECG OF 27-FEB-2017 13:56,

NO SIGNIFICANT CHANGE WAS FOUND

Confirmed by TENA DUARTE MD ([id number]) on 5/25/2017 1:12:22 PM

 

Referred By: Kt Chung           Confirmed By:TENA DUARTE MD

## 2017-05-25 NOTE — PN
S CIWA





- CIWA Score


Nausea/Vomitin-No Nausea/No Vomiting


Muscle Tremors: 4-Moderate,w/Arms Extend


Anxiety: 3


Agitation: 4-Moderately Restless


Paroxysmal Sweats: 3


Orientation: 0-Oriented


Tacttile Disturbances: 0-None


Auditory Disturbances: 0-None


Visual Disturbances: 0-None


Headache: 1-Very Mild


CIWA-Ar Total Score: 15





BHS Progress Note (SOAP)


Subjective: 


sweats


shakes


interrupted sleep


body aches


diarrhea


Objective: 





17 11:26


 Vital Signs











Temperature  98.2 F   17 09:45


 


Pulse Rate  109  17 09:45


 


Respiratory Rate  16   17 09:45


 


Blood Pressure  146/103   17 09:45


 


O2 Sat by Pulse Oximetry (%)      








 Laboratory Tests











  17





  17:01 06:00 06:00


 


WBC   10.3 H D 


 


RBC   3.66 L 


 


Hgb   13.0  D 


 


Hct   38.0 


 


MCV   104.0 H 


 


MCHC   34.3 


 


RDW   14.4 


 


Plt Count   571 H D 


 


MPV   8.0 


 


Sodium    143


 


Potassium    4.2


 


Chloride    109 H


 


Carbon Dioxide    22


 


Anion Gap    12


 


BUN    7


 


Creatinine    0.8


 


Creat Clearance w eGFR    > 60


 


Random Glucose    123 H


 


Calcium    8.6


 


Total Bilirubin    0.2  D


 


AST    64 H D


 


ALT    60  D


 


Alkaline Phosphatase    92


 


Total Protein    7.6


 


Albumin    3.7


 


Urine Color  Ltyellow  


 


Urine Appearance  Clear  


 


Urine pH  5.0  


 


Ur Specific Gravity  1.025  


 


Urine Protein  Negative  


 


Urine Glucose (UA)  Negative  


 


Urine Ketones  Negative  


 


Urine Blood  Negative  


 


Urine Nitrite  Negative  


 


Urine Bilirubin  Negative  


 


Urine Urobilinogen  Negative  


 


Ur Leukocyte Esterase  Negative  








awake/alert


ambulating


no acute distress


Assessment: 





17 11:28


withdrawal sx


Plan: 


continue detox


increase fluids


clonidine 0.1mg x one for increased BP


monitor BP

## 2017-05-26 RX ADMIN — Medication SCH TAB: at 10:36

## 2017-05-26 RX ADMIN — NICOTINE SCH: 14 PATCH, EXTENDED RELEASE TRANSDERMAL at 10:36

## 2017-05-26 RX ADMIN — Medication SCH MG: at 22:40

## 2017-05-26 RX ADMIN — HYDROXYZINE PAMOATE PRN MG: 50 CAPSULE ORAL at 10:36

## 2017-05-26 NOTE — PN
S CIWA





- CIWA Score


Nausea/Vomitin


Muscle Tremors: 3


Anxiety: 3


Agitation: 1-Slight > Activity


Paroxysmal Sweats: 3


Orientation: 0-Oriented


Tacttile Disturbances: 0-None


Auditory Disturbances: 0-None


Visual Disturbances: 3-Moderate Sensitivity


Headache: 3-Moderate


CIWA-Ar Total Score: 18





BHS Progress Note (SOAP)


Subjective: 


Stomach Cramping, Diarrhea, Sweating, Interrupted Sleep, H/A, Body Aches, 

Tremors.


Objective: 


PT. A & O X 3.





17 12:08


 Vital Signs











Temperature  97.1 F L  17 10:00


 


Pulse Rate  81   17 10:00


 


Respiratory Rate  16   17 10:00


 


Blood Pressure  127/90   17 10:00


 


O2 Sat by Pulse Oximetry (%)      








 Laboratory Tests











  17





  17:01 06:00 06:00


 


WBC   10.3 H D 


 


RBC   3.66 L 


 


Hgb   13.0  D 


 


Hct   38.0 


 


MCV   104.0 H 


 


MCHC   34.3 


 


RDW   14.4 


 


Plt Count   571 H D 


 


MPV   8.0 


 


Sodium    143


 


Potassium    4.2


 


Chloride    109 H


 


Carbon Dioxide    22


 


Anion Gap    12


 


BUN    7


 


Creatinine    0.8


 


Creat Clearance w eGFR    > 60


 


Random Glucose    123 H


 


Calcium    8.6


 


Total Bilirubin    0.2  D


 


AST    64 H D


 


ALT    60  D


 


Alkaline Phosphatase    92


 


Total Protein    7.6


 


Albumin    3.7


 


Urine Color  Ltyellow  


 


Urine Appearance  Clear  


 


Urine pH  5.0  


 


Ur Specific Gravity  1.025  


 


Urine Protein  Negative  


 


Urine Glucose (UA)  Negative  


 


Urine Ketones  Negative  


 


Urine Blood  Negative  


 


Urine Nitrite  Negative  


 


Urine Bilirubin  Negative  


 


Urine Urobilinogen  Negative  


 


Ur Leukocyte Esterase  Negative  


 


RPR Titer   














  17





  06:00


 


WBC 


 


RBC 


 


Hgb 


 


Hct 


 


MCV 


 


MCHC 


 


RDW 


 


Plt Count 


 


MPV 


 


Sodium 


 


Potassium 


 


Chloride 


 


Carbon Dioxide 


 


Anion Gap 


 


BUN 


 


Creatinine 


 


Creat Clearance w eGFR 


 


Random Glucose 


 


Calcium 


 


Total Bilirubin 


 


AST 


 


ALT 


 


Alkaline Phosphatase 


 


Total Protein 


 


Albumin 


 


Urine Color 


 


Urine Appearance 


 


Urine pH 


 


Ur Specific Gravity 


 


Urine Protein 


 


Urine Glucose (UA) 


 


Urine Ketones 


 


Urine Blood 


 


Urine Nitrite 


 


Urine Bilirubin 


 


Urine Urobilinogen 


 


Ur Leukocyte Esterase 


 


RPR Titer  Nonreactive








LABS NOTED.


Assessment: 





17 12:08


WITHDRAWAL SYMPTOMS.


Plan: 


CONTINUE DETOX.


CONTINUE TO MONITOR BP.

## 2017-05-26 NOTE — PN
BHS Progress Note


Note: 


Psychiatry


Attending's note :


Brief meeting with patient.


Noted steady gait and clear sensorium.


Mr Small is conversant,active and well controlled.


Medications are well tolerated.Will titrate seroquel.


Plan :


Discontinue seroquel 200 mg po hs 


Seroquel 300 mg po hs.


Patient is in agreement with this careplan.

## 2017-05-27 RX ADMIN — HYDROXYZINE PAMOATE PRN MG: 50 CAPSULE ORAL at 22:36

## 2017-05-27 RX ADMIN — Medication SCH TAB: at 10:46

## 2017-05-27 RX ADMIN — Medication SCH: at 23:26

## 2017-05-27 RX ADMIN — HYDROXYZINE PAMOATE PRN MG: 50 CAPSULE ORAL at 10:47

## 2017-05-27 RX ADMIN — NICOTINE SCH: 14 PATCH, EXTENDED RELEASE TRANSDERMAL at 10:47

## 2017-05-27 NOTE — PN
BHS Progress Note (SOAP)


Subjective: 


Tremors, Interrupted sleep, Sweating.


Objective: 


PT. A & O X 3.





05/27/17 16:37


 Vital Signs











Temperature  98.2 F   05/27/17 14:43


 


Pulse Rate  118 H  05/27/17 14:43


 


Respiratory Rate  20   05/27/17 14:43


 


Blood Pressure  97/80   05/27/17 14:43


 


O2 Sat by Pulse Oximetry (%)      








 Laboratory Tests











  05/24/17 05/25/17 05/25/17





  17:01 06:00 06:00


 


WBC   10.3 H D 


 


RBC   3.66 L 


 


Hgb   13.0  D 


 


Hct   38.0 


 


MCV   104.0 H 


 


MCHC   34.3 


 


RDW   14.4 


 


Plt Count   571 H D 


 


MPV   8.0 


 


Sodium    143


 


Potassium    4.2


 


Chloride    109 H


 


Carbon Dioxide    22


 


Anion Gap    12


 


BUN    7


 


Creatinine    0.8


 


Creat Clearance w eGFR    > 60


 


Random Glucose    123 H


 


Calcium    8.6


 


Total Bilirubin    0.2  D


 


AST    64 H D


 


ALT    60  D


 


Alkaline Phosphatase    92


 


Total Protein    7.6


 


Albumin    3.7


 


Urine Color  Ltyellow  


 


Urine Appearance  Clear  


 


Urine pH  5.0  


 


Ur Specific Gravity  1.025  


 


Urine Protein  Negative  


 


Urine Glucose (UA)  Negative  


 


Urine Ketones  Negative  


 


Urine Blood  Negative  


 


Urine Nitrite  Negative  


 


Urine Bilirubin  Negative  


 


Urine Urobilinogen  Negative  


 


Ur Leukocyte Esterase  Negative  


 


RPR Titer   














  05/25/17





  06:00


 


WBC 


 


RBC 


 


Hgb 


 


Hct 


 


MCV 


 


MCHC 


 


RDW 


 


Plt Count 


 


MPV 


 


Sodium 


 


Potassium 


 


Chloride 


 


Carbon Dioxide 


 


Anion Gap 


 


BUN 


 


Creatinine 


 


Creat Clearance w eGFR 


 


Random Glucose 


 


Calcium 


 


Total Bilirubin 


 


AST 


 


ALT 


 


Alkaline Phosphatase 


 


Total Protein 


 


Albumin 


 


Urine Color 


 


Urine Appearance 


 


Urine pH 


 


Ur Specific Gravity 


 


Urine Protein 


 


Urine Glucose (UA) 


 


Urine Ketones 


 


Urine Blood 


 


Urine Nitrite 


 


Urine Bilirubin 


 


Urine Urobilinogen 


 


Ur Leukocyte Esterase 


 


RPR Titer  Nonreactive








LABS NOTED.


Assessment: 





05/27/17 16:37


WITHDRAWAL SYMPTOMS.


Plan: 


CONTINUE DETOX.


ADVISED PATIENT TO FOLLOW-UP WITH  AFTER DISCHARGE FROM DETOX FOR GENERAL 

MEDICAL ASSESSMENT AND FOR ABNORMAL ADMISSION CBC VALUES (WBC, RBC, MCV, AND 

PLATELETS).

## 2017-05-28 VITALS — HEART RATE: 107 BPM | SYSTOLIC BLOOD PRESSURE: 120 MMHG | DIASTOLIC BLOOD PRESSURE: 76 MMHG | TEMPERATURE: 96.4 F

## 2017-05-28 NOTE — DS
BHS Detox Discharge Summary


Admission Date: 


05/24/17





Discharge Date: 05/28/17





- History


Present History: Alcohol Dependence


Pertinent Past History: 


Arthritis


Mood disorder





- Physical Exam Results


Vital Signs: 


 Vital Signs











Temperature  97.6 F   05/28/17 06:40


 


Pulse Rate  71   05/28/17 06:40


 


Respiratory Rate  20   05/28/17 06:40


 


Blood Pressure  130/75   05/28/17 06:40


 


O2 Sat by Pulse Oximetry (%)      











Pertinent Admission Physical Exam Findings: 


Withdrawal SX.


 Laboratory Last Values











WBC  10.3 K/mm3 (4.0-10.0)  H D 05/25/17  06:00    


 


RBC  3.66 M/mm3 (4.00-5.60)  L  05/25/17  06:00    


 


Hgb  13.0 GM/dL (11.7-16.9)  D 05/25/17  06:00    


 


Hct  38.0 % (35.4-49)   05/25/17  06:00    


 


MCV  104.0 fl (80-96)  H  05/25/17  06:00    


 


MCHC  34.3 g/dl (32.0-35.9)   05/25/17  06:00    


 


RDW  14.4 % (11.9-15.9)   05/25/17  06:00    


 


Plt Count  571 K/MM3 (134-434)  H D 05/25/17  06:00    


 


MPV  8.0 fl (7.5-11.1)   05/25/17  06:00    


 


Sodium  143 mmol/L (136-145)   05/25/17  06:00    


 


Potassium  4.2 mmol/L (3.5-5.1)   05/25/17  06:00    


 


Chloride  109 mmol/L ()  H  05/25/17  06:00    


 


Carbon Dioxide  22 mmol/L (21-32)   05/25/17  06:00    


 


Anion Gap  12  (8-16)   05/25/17  06:00    


 


BUN  7 mg/dL (7-18)   05/25/17  06:00    


 


Creatinine  0.8 mg/dL (0.7-1.3)   05/25/17  06:00    


 


Creat Clearance w eGFR  > 60  (>60)   05/25/17  06:00    


 


Random Glucose  123 mg/dL ()  H  05/25/17  06:00    


 


Calcium  8.6 mg/dL (8.5-10.1)   05/25/17  06:00    


 


Total Bilirubin  0.2 mg/dL (0.2-1.0)  D 05/25/17  06:00    


 


AST  64 U/L (15-37)  H D 05/25/17  06:00    


 


ALT  60 U/L (12-78)  D 05/25/17  06:00    


 


Alkaline Phosphatase  92 U/L ()   05/25/17  06:00    


 


Total Protein  7.6 g/dl (6.4-8.2)   05/25/17  06:00    


 


Albumin  3.7 g/dl (3.4-5.0)   05/25/17  06:00    


 


Urine Color  Ltyellow   05/24/17  17:01    


 


Urine Appearance  Clear   05/24/17  17:01    


 


Urine pH  5.0  (5.0-8.0)   05/24/17  17:01    


 


Ur Specific Gravity  1.025  (1.005-1.025)   05/24/17  17:01    


 


Urine Protein  Negative  (NEGATIVE)   05/24/17  17:01    


 


Urine Glucose (UA)  Negative  (NEGATIVE)   05/24/17  17:01    


 


Urine Ketones  Negative  (NEGATIVE)   05/24/17  17:01    


 


Urine Blood  Negative  (NEGATIVE)   05/24/17  17:01    


 


Urine Nitrite  Negative  (NEGATIVE)   05/24/17  17:01    


 


Urine Bilirubin  Negative  (NEGATIVE)   05/24/17  17:01    


 


Urine Urobilinogen  Negative E.U./dl (0.2-1.0)   05/24/17  17:01    


 


Ur Leukocyte Esterase  Negative  (NEGATIVE)   05/24/17  17:01    


 


RPR Titer  Nonreactive  (NONREACTIVE)   05/25/17  06:00    








labs noted





- Treatment


Hospital Course: Detox Protocol Followed, Detoxed Safely, Responded well, 

Discharged Condition Good, Rehab Referral Accepted


Patient has Accepted a Rehab Referral to: [facility]





- Medication


Discharge Medications: 


Ambulatory Orders





Sertraline HCl [Zoloft -] 100 mg PO DAILY #30 tablet 04/16/16 


Quetiapine Fumarate [Seroquel -] 400 mg PO HS #30 tab 02/27/17 


Quetiapine Fumarate [Seroquel -] 400 mg PO HS #30 tab 05/24/17 











- Diagnosis


(1) Alcohol dependence with withdrawal, uncomplicated


Current Visit: Yes   Status: Acute





(2) Insomnia


Current Visit: Yes   Status: Acute   





(3) Nicotine dependence


Current Visit: Yes   Status: Acute   Qualifiers: 


     Nicotine product type: cigarettes     Substance use status: in withdrawal 

       Qualified Code(s): F17.213 - Nicotine dependence, cigarettes, with 

withdrawal  





(4) Substance induced mood disorder


Current Visit: Yes   Status: Acute





(5) Arthritis


Current Visit: Yes   Status: Chronic





(6) Bipolar II disorder


Current Visit: Yes   Status: Chronic





(7) Hyperlipidemia


Current Visit: Yes   Status: Suspected   Qualifiers: 


     Hyperlipidemia type: unspecified        Qualified Code(s): E78.5 - 

Hyperlipidemia, unspecified  





(8) Drug-induced mood disorder


Current Visit: Yes   Status: Acute





(9) Substance-induced sleep disorder


Current Visit: Yes   Status: Acute








- AMA


Did Patient Leave Against Medical Advice: No

## 2017-07-20 ENCOUNTER — HOSPITAL ENCOUNTER (EMERGENCY)
Dept: HOSPITAL 74 - JER | Age: 47
Discharge: OTHER INSTITUTION | End: 2017-07-20
Payer: COMMERCIAL

## 2017-07-20 VITALS — SYSTOLIC BLOOD PRESSURE: 113 MMHG | DIASTOLIC BLOOD PRESSURE: 78 MMHG | TEMPERATURE: 97 F | HEART RATE: 90 BPM

## 2017-07-20 VITALS — BODY MASS INDEX: 33.3 KG/M2

## 2017-07-20 DIAGNOSIS — G40.509: ICD-10-CM

## 2017-07-20 DIAGNOSIS — F10.120: Primary | ICD-10-CM

## 2017-07-20 DIAGNOSIS — F31.9: ICD-10-CM

## 2017-07-20 DIAGNOSIS — I10: ICD-10-CM

## 2017-07-20 DIAGNOSIS — E78.00: ICD-10-CM
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
## 2017-07-20 LAB
ALBUMIN SERPL-MCNC: 4 G/DL (ref 3.4–5)
ALP SERPL-CCNC: 81 U/L (ref 45–117)
ALT SERPL-CCNC: 71 U/L (ref 12–78)
ANION GAP SERPL CALC-SCNC: 10 MMOL/L (ref 8–16)
AST SERPL-CCNC: 98 U/L (ref 15–37)
BASOPHILS # BLD: 1.4 % (ref 0–2)
BILIRUB SERPL-MCNC: 0.6 MG/DL (ref 0.2–1)
CALCIUM SERPL-MCNC: 8.4 MG/DL (ref 8.5–10.1)
CO2 SERPL-SCNC: 27 MMOL/L (ref 21–32)
CREAT SERPL-MCNC: 0.7 MG/DL (ref 0.7–1.3)
DEPRECATED RDW RBC AUTO: 14.4 % (ref 11.9–15.9)
EOSINOPHIL # BLD: 1.9 % (ref 0–4.5)
GLUCOSE SERPL-MCNC: 82 MG/DL (ref 74–106)
MCH RBC QN AUTO: 35.2 PG (ref 25.7–33.7)
MCHC RBC AUTO-ENTMCNC: 33.5 G/DL (ref 32–35.9)
MCV RBC: 105 FL (ref 80–96)
NEUTROPHILS # BLD: 56.6 % (ref 42.8–82.8)
PLATELET # BLD AUTO: 100 K/MM3 (ref 134–434)
PMV BLD: 7.9 FL (ref 7.5–11.1)
PROT SERPL-MCNC: 7.3 G/DL (ref 6.4–8.2)
TROPONIN I SERPL-MCNC: < 0.02 NG/ML (ref 0–0.05)
WBC # BLD AUTO: 5.7 K/MM3 (ref 4–10)

## 2017-07-20 NOTE — PDOC
History of Present Illness





- General


History Source: Patient, Old Records


Exam Limitations: Intoxication





- History of Present Illness


Initial Comments: 


07/20/17 14:32


The patient is a 47-year-old man with a significant past medical history of 

hypertension, hypercholesterolemia, bipolar disorder, seizure disorder (alcohol 

related), anemia and EtOH abuse who was sent to the emergency department from 

[facility] for stabilization for detox program. As per [facility] 

note, the patient was in [facility] this morning and was transferred to 

[facility] for detox. Patient was found to be severely intoxicated at [facility] 

with a breathalyzer measurement of 0.455 mg/dl. 





As per patient, he was drinking last night approximately 2-3 bottles of his 

typical 375mL of Vodka. He only recalls that he was drinking after liquor store 

hours and it was late at night. He woke up this morning at a hospital. He 

currently is hungry and is screaming for food. 





Allergies: No Known Allergies


Social History: Smokes 6 cigarettes/day for "a number of years). EtOH abuse (

approximately 3-4 bottles of approximately "375mL" of Vodka daily) No 

recreational drug use.





<Hailey Armenta - Last Filed: 07/20/17 16:24>





<Karlee Penaloza - Last Filed: 07/21/17 10:19>





- General


Chief Complaint: Alcohol intoxication


Stated Complaint: Alcohol intoxication


Time Seen by Provider: 07/20/17 12:49





Past History





<Hailey Armenta - Last Filed: 07/20/17 16:24>





- Past Medical History


Anemia: Yes (NO CURRENT MED)


Asthma: No


Cancer: No


Cardiac Disorders: No


CVA: No


COPD: No


CHF: No


Dementia: No


Diabetes: No


GI Disorders: No


 Disorders: No


HTN: Yes (NO CURRENT MED. LISINOPRIL 5 MG IN 2016 ON PHARM REC.)


Hypercholesterolemia: Yes (NO MEDS)


Kidney Stones: No


Liver Disease: No


Psychiatric Problems: Yes (bipolar)


Suicide Attempt (Hx): No (DENIES)


Seizures: Yes (alcohol related-last episode was in 2014)


Thyroid Disease: No





- Surgical History


Abdominal Surgery: No


Appendectomy: No


Cardiac Surgery: No


Cholecystectomy: No


Lung Surgery: No


Neurologic Surgery: No


Orthopedic Surgery: No





- Reproductive History


Testicular Surgery: No





- Immunization History


Immunization Up to Date: No





- Psycho/Social/Smoking Cessation Hx


Anxiety: No


Suicidal Ideation: No


Smoking History: Current every day smoker


Have you smoked in the past 12 months: No


Number of Cigarettes Smoked Daily: 9


Cigars Per Day: 0


Information on smoking cessation initiated: No


'Breaking Loose' booklet given: 05/24/17


Hx Alcohol Use: No


Drug/Substance Use Hx: No


Substance Use Type: Alcohol


Hx Substance Use Treatment: Yes ([facility]-DETOX)





<Karlee Penaloza - Last Filed: 07/21/17 10:19>





- Past Medical History


Allergies/Adverse Reactions: 


 Allergies











Allergy/AdvReac Type Severity Reaction Status Date / Time


 


No Known Allergies Allergy   Verified 07/20/17 13:27











Home Medications: 


Ambulatory Orders





Sertraline HCl [Zoloft -] 100 mg PO DAILY #30 tablet 04/16/16 


Quetiapine Fumarate [Seroquel -] 400 mg PO HS #30 tab 02/27/17 


Quetiapine Fumarate [Seroquel -] 400 mg PO HS #30 tab 05/24/17 











**Review of Systems





- Review of Systems


Able to Perform ROS?: No





<Hailey Armenta - Last Filed: 07/20/17 16:24>





*Physical Exam





- Vital Signs


 Last Vital Signs











Temp Pulse Resp BP Pulse Ox


 


 97.0 F L  90   18   113/78   100 


 


 07/20/17 12:10  07/20/17 12:10  07/20/17 12:10  07/20/17 12:10  07/20/17 12:10














- Physical Exam


Comments: 


07/20/17 14:33


GENERAL: The patient is in no acute distress.


HEAD: Normal with no signs of trauma.


EYES: PERRLA, EOMI, sclera anicteric, conjunctiva clear.


ENT: Ears normal, nares patent, oropharynx clear without exudates.  Moist 

mucous membranes.


NECK: Normal range of motion, supple without lymphadenopathy, JVD, or masses.


LUNGS: Breath sounds equal, clear to auscultation bilaterally.  No wheezes, and 

no crackles.


HEART:Regular rate and rhythm, normal S1 and S2 without murmur, rub or gallop.


ABDOMEN: Soft, nontender, normoactive bowel sounds.  No guarding, no rebound.


EXTREMITIES: Normal range of motion, no edema.  No clubbing or cyanosis. No 

erythema, or tenderness.


NEUROLOGICAL: Cranial nerves II through XII grossly intact.  Normal speech.  No 

focal 


neurological deficits. 


MUSCULOSKELETAL:  Back non-tender to palpation, no CVA tenderness


SKIN: Warm, Dry, normal turgor, no rashes or lesions noted.








<Hailey Armenta - Last Filed: 07/20/17 16:24>





- Vital Signs


 Last Vital Signs











Temp Pulse Resp BP Pulse Ox


 


 97.0 F L  90   18   113/78   100 


 


 07/20/17 12:10  07/20/17 12:10  07/20/17 12:10  07/20/17 12:10  07/20/17 12:10














<Karlee Penaloza - Last Filed: 07/21/17 10:19>





ED Treatment Course





- LABORATORY


CBC & Chemistry Diagram: 


 07/20/17 13:30





 07/20/17 13:30





- ADDITIONAL ORDERS


Additional order review: 


 Laboratory  Results











  07/20/17 07/20/17





  13:30 13:30


 


Sodium   144


 


Potassium   3.8


 


Chloride   107


 


Carbon Dioxide   27  D


 


Anion Gap   10


 


BUN   6 L


 


Creatinine   0.7


 


Creat Clearance w eGFR   > 60


 


Random Glucose   82  D


 


Calcium   8.4 L


 


Total Bilirubin   0.6  D


 


AST   98 H D


 


ALT   71


 


Alkaline Phosphatase   81


 


Total Protein   7.3


 


Albumin   4.0


 


Alcohol, Quantitative  367.6 H* 








 











  07/20/17





  13:30


 


RBC  3.74 L


 


MCV  105.0 H


 


MCHC  33.5


 


RDW  14.4


 


MPV  7.9


 


Neutrophils %  56.6  D


 


Lymphocytes %  32.1  D


 


Monocytes %  8.0


 


Eosinophils %  1.9  D


 


Basophils %  1.4














- RADIOLOGY


Radiograph Interpretation: 





07/20/17 15:12


EXAM: RAD/CHEST X-RAY PORTABLE


Interpreted by Dr. Sixto Pitt


IMPRESSION: Comparison study May 25, 2017. Lungs are well aerated. No evidence 

of pneumonia, CHF, pleural effusion or pneumothorax. Unremarkable contour of 

the cardiomediastinal silhouette. Intact visualized osseous structures.





<Hailey Armenta - Last Filed: 07/20/17 16:24>





- LABORATORY


CBC & Chemistry Diagram: 


 07/20/17 13:30





 07/20/17 13:30





<Karlee Penaloza - Last Filed: 07/21/17 10:19>





Medical Decision Making





- Medical Decision Making


A portion of this note was documented by scribe services under my direction. I 

have reviewed the details of the note, within reason, and agree with the 

documentation with the following case summary and management plan written by me.


Nursing documentation reviewed and incorporated into medical decision making








This is a 46yo M h/o HTN, HLD, bipolar d/o, seizure disorder who was sent to 

the ER from [facility] due to intoxication


The patient was in [facility] this morning and was transferred to [facility] for detox. 


Patient was found to be severely intoxicated at [facility] with a breathalyzer 

measurement of 0.455 mg/dl. 


Pt sent to the ER for stabilization





Pt states he was drinking last night approximately 2-3 bottles of his typical 

375mL of Vodka. 





Allergies: No Known Allergies


Social History: Smokes 6 cigarettes/day for "a number of years). EtOH abuse (

approximately 3-4 bottles of approximately "375mL" of Vodka daily) No 

recreational drug use.





07/20/17 14:21


 Laboratory Tests











  07/20/17 07/20/17 07/20/17





  13:30 13:30 13:30


 


WBC  5.7  D  


 


Hgb  13.2  


 


Hct  39.2  


 


Plt Count  100 L D  


 


BUN   6 L 


 


Creatinine   0.7 


 


Alcohol, Quantitative    367.6 H*














CXR: nml


Trop added





07/20/17 15:13


Call placed to Dr Chung


This patient can be sent back to [facility] once he is stable, able to give a 

history


This patient has been walking around the ER


He has demanded a sandwich multiple times





After given a sandwich, pt went back to bed and fell asleep





07/20/17 15:22


 Laboratory Tests











  07/20/17





  13:30


 


Creatine Kinase  480 H


 


CK-MB (CK-2)  2.719


 


Troponin I  < 0.02











Labs nml


Pt can be transferred to [facility]


Pt last seen on rounds at 5:15 pm





Alcohol intoxication





<Karlee Penaloza - Last Filed: 07/21/17 10:19>





*DC/Admit/Observation/Transfer





- Attestations


Scribe Attestion: 





07/20/17 14:33





Documentation prepared by Hailey Armenta, acting as medical scribe for 

Karlee Penaloza MD.





<Hailey Armenta - Last Filed: 07/20/17 16:24>





- Discharge Dispostion


Admit: No





<Karlee Penaloza - Last Filed: 07/21/17 10:19>


Diagnosis at time of Disposition: 


Alcohol intoxication


Qualifiers:


 Complication of substance-induced condition: uncomplicated Qualified Code(s): 

F10.920 - Alcohol use, unspecified with intoxication, uncomplicated





- Discharge Dispostion


Disposition: I.P. ALCOHOL/SUBS ABUSE REHAB


Condition at time of disposition: Stable





- Patient Instructions


Printed Discharge Instructions:  DI for Alcohol Abuse

## 2017-09-30 ENCOUNTER — HOSPITAL ENCOUNTER (INPATIENT)
Dept: HOSPITAL 74 - YASAS | Age: 47
LOS: 4 days | Discharge: HOME | End: 2017-10-04
Attending: INTERNAL MEDICINE | Admitting: INTERNAL MEDICINE
Payer: COMMERCIAL

## 2017-09-30 VITALS — BODY MASS INDEX: 28.3 KG/M2

## 2017-09-30 DIAGNOSIS — F10.220: ICD-10-CM

## 2017-09-30 DIAGNOSIS — I10: ICD-10-CM

## 2017-09-30 DIAGNOSIS — F19.24: ICD-10-CM

## 2017-09-30 DIAGNOSIS — S50.812D: ICD-10-CM

## 2017-09-30 DIAGNOSIS — M19.90: ICD-10-CM

## 2017-09-30 DIAGNOSIS — F19.282: ICD-10-CM

## 2017-09-30 DIAGNOSIS — Z87.438: ICD-10-CM

## 2017-09-30 DIAGNOSIS — Z86.69: ICD-10-CM
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
## 2017-09-30 DIAGNOSIS — Z86.73: ICD-10-CM

## 2017-09-30 DIAGNOSIS — F17.213: ICD-10-CM

## 2017-09-30 DIAGNOSIS — F10.230: Primary | ICD-10-CM

## 2017-09-30 DIAGNOSIS — F31.89: ICD-10-CM

## 2017-09-30 DIAGNOSIS — Z86.2: ICD-10-CM

## 2017-09-30 PROCEDURE — HZ2ZZZZ DETOXIFICATION SERVICES FOR SUBSTANCE ABUSE TREATMENT: ICD-10-PCS | Performed by: SURGERY

## 2017-09-30 PROCEDURE — G0008 ADMIN INFLUENZA VIRUS VAC: HCPCS

## 2017-10-01 LAB
ALBUMIN SERPL-MCNC: 3.7 G/DL (ref 3.4–5)
ALP SERPL-CCNC: 70 U/L (ref 45–117)
ALT SERPL-CCNC: 66 U/L (ref 12–78)
ANION GAP SERPL CALC-SCNC: 7 MMOL/L (ref 8–16)
APPEARANCE UR: (no result)
AST SERPL-CCNC: 58 U/L (ref 15–37)
BILIRUB SERPL-MCNC: 0.7 MG/DL (ref 0.2–1)
BILIRUB UR STRIP.AUTO-MCNC: NEGATIVE MG/DL
CALCIUM SERPL-MCNC: 7.9 MG/DL (ref 8.5–10.1)
CO2 SERPL-SCNC: 26 MMOL/L (ref 21–32)
COLOR UR: (no result)
CREAT SERPL-MCNC: 0.7 MG/DL (ref 0.7–1.3)
DEPRECATED RDW RBC AUTO: 14 % (ref 11.9–15.9)
GLUCOSE SERPL-MCNC: 66 MG/DL (ref 74–106)
HIV 1 & 2 AB: NEGATIVE
HIV 1 AGP24: NEGATIVE
KETONES UR QL STRIP: NEGATIVE
LEUKOCYTE ESTERASE UR QL STRIP.AUTO: NEGATIVE
MCH RBC QN AUTO: 35.3 PG (ref 25.7–33.7)
MCHC RBC AUTO-ENTMCNC: 33.7 G/DL (ref 32–35.9)
MCV RBC: 104.7 FL (ref 80–96)
NITRITE UR QL STRIP: NEGATIVE
PH UR: 5 [PH] (ref 5–8)
PLATELET # BLD AUTO: 125 K/MM3 (ref 134–434)
PMV BLD: 8.1 FL (ref 7.5–11.1)
PROT SERPL-MCNC: 6.9 G/DL (ref 6.4–8.2)
PROT UR QL STRIP: NEGATIVE
PROT UR QL STRIP: NEGATIVE
RBC # UR STRIP: NEGATIVE /UL
SP GR UR: 1.02 (ref 1–1.02)
UROBILINOGEN UR STRIP-MCNC: NEGATIVE MG/DL (ref 0.2–1)
WBC # BLD AUTO: 6.2 K/MM3 (ref 4–10)

## 2017-10-01 RX ADMIN — Medication SCH MG: at 22:30

## 2017-10-01 RX ADMIN — IBUPROFEN PRN MG: 400 TABLET, FILM COATED ORAL at 10:55

## 2017-10-01 RX ADMIN — HYDROXYZINE PAMOATE PRN MG: 50 CAPSULE ORAL at 10:54

## 2017-10-01 RX ADMIN — BACITRACIN ZINC SCH APPLIC: 500 OINTMENT TOPICAL at 10:56

## 2017-10-01 RX ADMIN — Medication SCH TAB: at 10:54

## 2017-10-01 RX ADMIN — LISINOPRIL SCH MG: 5 TABLET ORAL at 10:54

## 2017-10-01 NOTE — EKG
Test Reason : 

Blood Pressure : ***/*** mmHG

Vent. Rate : 092 BPM     Atrial Rate : 092 BPM

   P-R Int : 178 ms          QRS Dur : 090 ms

    QT Int : 412 ms       P-R-T Axes : 028 019 -22 degrees

   QTc Int : 509 ms

 

NORMAL SINUS RHYTHM

PROLONGED QT

NONSPECIFIC T WAVE ABNORMALITY

ABNORMAL ECG

WHEN COMPARED WITH ECG OF 24-MAY-2017 15:03,

NONSPECIFIC T WAVE ABNORMALITY IS NOW PRESENT

QT HAS LENGTHENED

Confirmed by KIRILL ROJAS, WENDY (2016) on 10/1/2017 8:46:10 PM

 

Referred By:             Confirmed By:WENDY ROY MD

## 2017-10-01 NOTE — PN
[facility] Progress Note


Note: 


PATIENT HAVING TREMOR,ADMITTED WITH ALCOHOL DEPENDENCE ON LIBRIUM REGIMENS


EKG NSR,NORMAL ECG


 Vital Signs











Temperature  97 F L  10/01/17 11:34


 


Pulse Rate  83   10/01/17 11:34


 


Respiratory Rate  18   10/01/17 11:34


 


Blood Pressure  133/84   10/01/17 11:34


 


O2 Sat by Pulse Oximetry (%)      








ABRASION OF LEFT FOREARM


CONTINUE DETOX,BACITRACIN OINTMENT BID

## 2017-10-01 NOTE — EKG
Test Reason : 

Blood Pressure : ***/*** mmHG

Vent. Rate : 075 BPM     Atrial Rate : 075 BPM

   P-R Int : 154 ms          QRS Dur : 086 ms

    QT Int : 390 ms       P-R-T Axes : -02 038 028 degrees

   QTc Int : 435 ms

 

NORMAL SINUS RHYTHM

NORMAL ECG

WHEN COMPARED WITH ECG OF 01-OCT-2017 00:23,

NONSPECIFIC T WAVE ABNORMALITY IS NO LONGER INFERIOR LEADS

QT HAS SHORTENED

Confirmed by KIRILL ROJAS, WENDY (2016) on 10/1/2017 8:44:20 PM

 

Referred By:             Confirmed By:WENDY ROY MD

## 2017-10-01 NOTE — HP
CIWA Score





- CIWA Score


Nausea/Vomitin


Muscle Tremors: 3


Anxiety: 2


Agitation: 2


Paroxysmal Sweats: 2


Orientation: 1-Uncertain about Date


Tacttile Disturbances: 1-Very Mild Itch/Numbness


Auditory Disturbances: 1-Very Mild


Visual Disturbances: 1-Very Mild Sensitivity


Headache: 2-Mild


CIWA-Ar Total Score: 17





Admission ROS [facility]





- HPI


Chief Complaint: 


WITHDRAWAL SYMPTOMS 


Allergies/Adverse Reactions: 


 Allergies











Allergy/AdvReac Type Severity Reaction Status Date / Time


 


No Known Allergies Allergy   Verified 17 13:27











History of Present Illness: 


47 Y.O. MAN WITH AN EXTENSIVE HISTORY OF ALCOHOL DEPENDENCE IS HERE SEEKING 

DETOX.  HE HAS HAD MULTIPLE ADMISSIONS HERE AND REPORTS HE DOES NOT HAVE A 

SIGNIFICANT PERIOD SOBER. 


Exam Limitations: Intoxication





- Ebola screening


Have you traveled outside of the country in the last 21 days: No (N)


Have you had contact with anyone from an Ebola affected area: No


Have you been sick,other than usual withdrawal symptoms: No


Do you have a fever: No





- Review of Systems


Constitutional: Changes in sleep


EENT: reports: Blurred Vision, Tearing


Respiratory: reports: Shortness of Breath


Cardiac: reports: Lightheadedness


GI: reports: Diarrhea, Vomiting, Abdominal cramping


: reports: No Symptoms Reported


Musculoskeletal: reports: Back Pain


Integumentary: reports: No Symptoms Reported


Neuro: reports: Seizure (ETOH RELATED: LAST WAS IN )


Endocrine: reports: No Symptoms Reported


Hematology: reports: No Symptoms Reported


Psychiatric: reports: Judgement Intact, Mood/Affect Appropiate, Depressed, 

Disorientated


Other Systems: Reviewed and Negative





Patient History





- Patient Medical History


Hx Anemia: Yes (NO CURRENT MED)


Hx Asthma: No


Hx Chronic Obstructive Pulmonary Disease (COPD): No


Hx Cancer: No


Hx Cardiac Disorders: No


Hx Congestive Heart Failure: No


Hx Hypertension: Yes


Hx Hypercholesterolemia: Yes (NO MEDS)


Hx Pacemaker: No


HX Cerebrovascular Accident: Yes ()


Hx Seizures: Yes (alcohol related-last episode was in )


Hx Dementia: No


Hx Diabetes: No


Hx Gastrointestinal Disorders: No


Hx Liver Disease: No


Hx Genitourinary Disorders: No


Hx Sexually Transmitted Disorders: Yes (syphilis)


Hx Renal Disease (ESRD): No


Hx Thyroid Disease: No


Hx Human Immunodeficiency Virus (HIV): No (NEGATIVE HX )


Hx Hepatitis C: No


Hx Depression: Yes


Hx Suicide Attempt: No (DENIES)


Hx Bipolar Disorder: Yes (on Seroquel and Zoloft as per Hx.)


Hx Schizophrenia: No





- Patient Surgical History


Past Surgical History: No


Hx Neurologic Surgery: No


Hx Cataract Extraction: No


Hx Cardiac Surgery: No


Hx Lung Surgery: No


Hx Breast Surgery: No


Hx Breast Biopsy: No


Hx Abdominal Surgery: No


Hx Appendectomy: No


Hx Cholecystectomy: No


Hx Genitourinary Surgery: No


Hx  Section: No


Hx Orthopedic Surgery: No


Anesthesia Reaction: No





- PPD History


Results: CXR neg 2017


PPD to be Administered?: No





- Reproductive History


Patient is a Female of Child Bearing Age (11 -55 yrs old): No





- Smoking Cessation


Smoking history: Current every day smoker


Have you smoked in the past 12 months: No


Aproximately how many cigarettes per day: 5


Cigars Per Day: 0


Hx Chewing Tobacco Use: No


Initiated information on smoking cessation: Yes


'Breaking Loose' booklet given: 10/01/17





- Substance & Tx. History


Hx Alcohol Use: Yes


Hx Substance Use: No


Substance Use Type: Alcohol


Hx Substance Use Treatment: Yes (DETOX: 2017  REHAB: 2016)





- Substances Abused


  ** Alcohol


Route: Oral


Frequency: Daily


Amount used: 2-3 PINTS OF LIQUOR 


Age of first use: 12


Date of Last Use: 10/01/17





Family Disease History





- Family Disease History


Family Disease History: Diabetes: Mother (HTN), Other: Father (alcohol,)

, Mother





Admission Physical Exam [facility]





- Vital Signs


Vital Signs: 


 Vital Signs - 24 hr











  17





  23:53


 


Temperature 98.8 F


 


Pulse Rate 99 H


 


Respiratory 18





Rate 


 


Blood Pressure 111/81














- Physical


General Appearance: Yes: Intoxicated


HEENTM: Yes: Hearing grossly Normal, Normocephalic, Normal Voice


Respiratory: Yes: Chest Non-Tender, Lungs Clear, Normal Breath Sounds, No 

Respiratory Distress, No Accessory Muscle Use


Neck: Yes: No masses,lesions,Nodules, Trachea in good position


Breast: Yes: Breast Exam Deferred


Cardiology: Yes: Regular Rhythm, Regular Rate


Abdominal: Yes: Normal Bowel Sounds, Non Tender, Flat, Soft


Genitourinary: Yes: Other (NO COMPLAINTS REPORTED)


Back: Yes: Normal Inspection


Musculoskeletal: Yes: full range of Motion, Gait Steady, Pelvis Stable


Extremities: Yes: Normal Capillary Refill, Normal Inspection, Normal Range of 

Motion


Neurological: Yes: Alert, Normal Mood/Affect, Normal Response


Integumentary: Yes: Normal Color, Dry, Warm


Lymphatic: Yes: Within Normal Limits





- Diagnostic


(1) Alcohol dependence in controlled environment


Current Visit: Yes   Status: Chronic





(2) Nicotine dependence


Current Visit: Yes   Status: Chronic   Qualifiers: 


     Nicotine product type: cigarettes     Substance use status: in withdrawal 

       Qualified Code(s): F17.213 - Nicotine dependence, cigarettes, with 

withdrawal  





(3) Arthritis


Current Visit: Yes   Status: Chronic


Comment: BOTH KNEES AND LEFT WRIST








(4) Seizure


Current Visit: Yes   Status: Chronic   Qualifiers: 


     Convulsion type: unspecified        Qualified Code(s): R56.9 - Unspecified 

convulsions  








Cleared for Admission [facility]





- Detox or Rehab


[facility] Level of Care: Medically Managed


Detox Regimen/Protocol: Librium





[facility] Breath Alcohol Content


Breath Alcohol Content: 0.260





Urine Drug Screen





- Results


Drug Screen Negative: No


Urine Drug Screen Results: BZO-Benzodiazepines

## 2017-10-02 RX ADMIN — Medication SCH TAB: at 10:51

## 2017-10-02 RX ADMIN — LISINOPRIL SCH MG: 5 TABLET ORAL at 10:51

## 2017-10-02 RX ADMIN — BACITRACIN ZINC SCH GM: 500 OINTMENT TOPICAL at 11:42

## 2017-10-02 RX ADMIN — SERTRALINE HYDROCHLORIDE SCH MG: 50 TABLET ORAL at 10:51

## 2017-10-02 RX ADMIN — BACITRACIN ZINC SCH: 500 OINTMENT TOPICAL at 11:43

## 2017-10-02 RX ADMIN — Medication SCH MG: at 22:07

## 2017-10-02 RX ADMIN — IBUPROFEN PRN MG: 400 TABLET, FILM COATED ORAL at 18:06

## 2017-10-02 NOTE — PN
S CIWA





- CIWA Score


Nausea/Vomiting: 3


Muscle Tremors: 3


Anxiety: 3


Agitation: 3


Paroxysmal Sweats: 1-Minimal Palms Moist


Orientation: 0-Oriented


Tacttile Disturbances: 1-Very Mild Itch/Numbness


Auditory Disturbances: 1-Very Mild


Visual Disturbances: 0-None


Headache: 2-Mild


CIWA-Ar Total Score: 17





BHS Progress Note (SOAP)


Subjective: 


alert,irritable,anxious,interrupted sleep,tremor


Objective: 


10/02/17 10:29


 Vital Signs











Temperature  97.9 F   10/02/17 09:22


 


Pulse Rate  71   10/02/17 09:22


 


Respiratory Rate  16   10/02/17 09:22


 


Blood Pressure  140/77   10/02/17 09:22


 


O2 Sat by Pulse Oximetry (%)      














10/02/17 10:29


 Laboratory Last Values











WBC  6.2 K/mm3 (4.0-10.0)   10/01/17  08:00    


 


RBC  3.51 M/mm3 (4.00-5.60)  L  10/01/17  08:00    


 


Hgb  12.4 GM/dL (11.7-16.9)   10/01/17  08:00    


 


Hct  36.8 % (35.4-49)   10/01/17  08:00    


 


MCV  104.7 fl (80-96)  H  10/01/17  08:00    


 


MCH  35.3 pg (25.7-33.7)  H  10/01/17  08:00    


 


MCHC  33.7 g/dl (32.0-35.9)   10/01/17  08:00    


 


RDW  14.0 % (11.9-15.9)   10/01/17  08:00    


 


Plt Count  125 K/MM3 (134-434)  L D 10/01/17  08:00    


 


MPV  8.1 fl (7.5-11.1)   10/01/17  08:00    


 


Sodium  139 mmol/L (136-145)   10/01/17  08:00    


 


Potassium  4.2 mmol/L (3.5-5.1)   10/01/17  08:00    


 


Chloride  106 mmol/L ()   10/01/17  08:00    


 


Carbon Dioxide  26 mmol/L (21-32)   10/01/17  08:00    


 


Anion Gap  7  (8-16)  L  10/01/17  08:00    


 


BUN  13 mg/dL (7-18)  D 10/01/17  08:00    


 


Creatinine  0.7 mg/dL (0.7-1.3)   10/01/17  08:00    


 


Creat Clearance w eGFR  > 60  (>60)   10/01/17  08:00    


 


Random Glucose  66 mg/dL ()  L  10/01/17  08:00    


 


Calcium  7.9 mg/dL (8.5-10.1)  L  10/01/17  08:00    


 


Total Bilirubin  0.7 mg/dL (0.2-1.0)   10/01/17  08:00    


 


AST  58 U/L (15-37)  H D 10/01/17  08:00    


 


ALT  66 U/L (12-78)   10/01/17  08:00    


 


Alkaline Phosphatase  70 U/L ()   10/01/17  08:00    


 


Total Protein  6.9 g/dl (6.4-8.2)   10/01/17  08:00    


 


Albumin  3.7 g/dl (3.4-5.0)   10/01/17  08:00    


 


Urine Color  Ltyellow   10/01/17  17:00    


 


Urine Appearance  Slcloudy   10/01/17  17:00    


 


Urine pH  5.0  (5.0-8.0)   10/01/17  17:00    


 


Ur Specific Gravity  1.020  (1.005-1.025)   10/01/17  17:00    


 


Urine Protein  Negative  (NEGATIVE)   10/01/17  17:00    


 


Urine Glucose (UA)  Negative  (NEGATIVE)   10/01/17  17:00    


 


Urine Ketones  Negative  (NEGATIVE)   10/01/17  17:00    


 


Urine Blood  Negative  (NEGATIVE)   10/01/17  17:00    


 


Urine Nitrite  Negative  (NEGATIVE)   10/01/17  17:00    


 


Urine Bilirubin  Negative  (NEGATIVE)   10/01/17  17:00    


 


Urine Urobilinogen  Negative mg/dL (0.2-1.0)   10/01/17  17:00    


 


RPR Titer  Nonreactive  (NONREACTIVE)   10/01/17  08:00    


 


HIV 1&2 Antibody Screen  Negative   10/01/17  08:00    


 


HIV P24 Antigen  Negative   10/01/17  08:00    











Assessment: 





10/02/17 10:29


withdrawal symptom


Plan: 


continue detox

## 2017-10-02 NOTE — CONSULT
[facility] Psychiatric Consult





- Data


Date of interview: 10/02/17


Admission source: [facility]


Identifying data: This is 47 years old male with psychiatric hospitalization 

history intoxicated with:  Alcohol, Xanax and Nicotine


Substance Abuse History: Smoking history: Current every day smoker.  Have you 

smoked in the past 12 months: No.  Aproximately how many cigarettes per day: 5.

  Cigars Per Day: 0.  Hx Chewing Tobacco Use: No.  Initiated information on 

smoking cessation: Yes.  'Breaking Loose' booklet given: 10/01/17.  - Substance 

& Tx. History.  Hx Alcohol Use: Yes.  Hx Substance Use: No.  Substance Use Type

: Alcohol.  Hx Substance Use Treatment: Yes (DETOX: 5/2017  REHAB: 11/2016).  - 

Substances Abused.  ** Alcohol.  Route: Oral.  Frequency: Daily.  Amount used: 2

-3 PINTS OF LIQUOR.  Age of first use: 12.  Date of Last Use: 10/01/17


Medical History: Arthritis, Head injury history, Hyperlipidemia, Syncope history


Psychiatric History: Ptient reprots history of Bipolar disordwer [facility] most 

recent psychiatric admission on:  2015 at [facility] for [facility].  

Currently taking:  Zoloft 100mg poqd.  Seroquel 400mg po qhs


Physical/Sexual Abuse/Trauma History: Denies


Additional Comment: Zoloft 100mg poqd.  Seroquel 400mg po qhs





Mental Status Exam





- Mental Status Exam


Cognitive Function: Fair


Patient Appearance: Unkempt


Mood: Sad


Affect: Flat


Patient Behavior: Sedated


Speech Pattern: Delayed


Voice Loudness: Mildly Soft/Quiet


Thought Process: Circumstantial


Thought Disorder: Being Controlled


Hallucinations: Denies


Suicidal Ideation: Denies


Homicidal Ideation: Denies


Insight/Judgement: Fair


Sleep: Difficulty falling asleep


Appetite: Weight gain


Muscle strength/Tone: Mild Hypertonicity


Gait/Station: Shuffling


Additional Comments: Zoloft 100mg poqd.  Seroquel 400mg po qhs





Psychiatric Findings





- Problem List (Axis 1, 2,3)


(1) Alcohol dependence in controlled environment


Current Visit: Yes   Status: Chronic





(2) Nicotine dependence


Current Visit: Yes   Status: Chronic   Qualifiers: 


     Nicotine product type: cigarettes     Substance use status: in withdrawal 

       Qualified Code(s): F17.213 - Nicotine dependence, cigarettes, with 

withdrawal; F17.213 - Nicotine dependence, cigarettes, with withdrawal  





(3) Alcohol dependence with withdrawal, uncomplicated


Current Visit: No   Status: Acute





(4) Alcohol intoxication


Current Visit: No   Status: Acute   Qualifiers: 


     Complication of substance-induced condition: uncomplicated     Qualified 

Code(s): F10.920 - Alcohol use, unspecified with intoxication, uncomplicated; 

F10.920 - Alcohol use, unspecified with intoxication, uncomplicated; F10.920 - 

Alcohol use, unspecified with intoxication, uncomplicated  





(5) Drug-induced mood disorder


Current Visit: No   Status: Acute





(6) Substance induced mood disorder


Current Visit: No   Status: Acute





(7) Substance-induced sleep disorder


Current Visit: No   Status: Acute





(8) Bipolar 1 disorder


Current Visit: No   Status: Chronic








- Initial Treatment Plan


Initial Treatment Plan: Zoloft 100mg poqd.  Seroquel 200mg po qhs

## 2017-10-03 RX ADMIN — Medication SCH MG: at 22:06

## 2017-10-03 RX ADMIN — LISINOPRIL SCH MG: 5 TABLET ORAL at 10:48

## 2017-10-03 RX ADMIN — BACITRACIN ZINC SCH GM: 500 OINTMENT TOPICAL at 10:48

## 2017-10-03 RX ADMIN — SERTRALINE HYDROCHLORIDE SCH MG: 50 TABLET ORAL at 10:48

## 2017-10-03 RX ADMIN — HYDROXYZINE PAMOATE PRN MG: 50 CAPSULE ORAL at 10:48

## 2017-10-03 RX ADMIN — Medication SCH TAB: at 10:48

## 2017-10-03 NOTE — PN
S CIWA





- CIWA Score


Nausea/Vomiting: 3


Muscle Tremors: 3


Anxiety: 3


Agitation: 2


Paroxysmal Sweats: 1-Minimal Palms Moist


Orientation: 0-Oriented


Tacttile Disturbances: 1-Very Mild Itch/Numbness


Auditory Disturbances: 1-Very Mild


Visual Disturbances: 0-None


Headache: 2-Mild


CIWA-Ar Total Score: 16





[facility] Progress Note (SOAP)


Subjective: 


ALERT,IRRITABLE,ANXIOUS,INTERRUPTED SLEEP,TREMOR


Objective: 





10/03/17 10:23


 Vital Signs











Temperature  97.8 F   10/03/17 06:00


 


Pulse Rate  114 H  10/03/17 06:00


 


Respiratory Rate  18   10/03/17 06:00


 


Blood Pressure  102/60   10/03/17 06:00


 


O2 Sat by Pulse Oximetry (%)      








 Laboratory Last Values











WBC  6.2 K/mm3 (4.0-10.0)   10/01/17  08:00    


 


RBC  3.51 M/mm3 (4.00-5.60)  L  10/01/17  08:00    


 


Hgb  12.4 GM/dL (11.7-16.9)   10/01/17  08:00    


 


Hct  36.8 % (35.4-49)   10/01/17  08:00    


 


MCV  104.7 fl (80-96)  H  10/01/17  08:00    


 


MCH  35.3 pg (25.7-33.7)  H  10/01/17  08:00    


 


MCHC  33.7 g/dl (32.0-35.9)   10/01/17  08:00    


 


RDW  14.0 % (11.9-15.9)   10/01/17  08:00    


 


Plt Count  125 K/MM3 (134-434)  L D 10/01/17  08:00    


 


MPV  8.1 fl (7.5-11.1)   10/01/17  08:00    


 


Sodium  139 mmol/L (136-145)   10/01/17  08:00    


 


Potassium  4.2 mmol/L (3.5-5.1)   10/01/17  08:00    


 


Chloride  106 mmol/L ()   10/01/17  08:00    


 


Carbon Dioxide  26 mmol/L (21-32)   10/01/17  08:00    


 


Anion Gap  7  (8-16)  L  10/01/17  08:00    


 


BUN  13 mg/dL (7-18)  D 10/01/17  08:00    


 


Creatinine  0.7 mg/dL (0.7-1.3)   10/01/17  08:00    


 


Creat Clearance w eGFR  > 60  (>60)   10/01/17  08:00    


 


Random Glucose  66 mg/dL ()  L  10/01/17  08:00    


 


Calcium  7.9 mg/dL (8.5-10.1)  L  10/01/17  08:00    


 


Total Bilirubin  0.7 mg/dL (0.2-1.0)   10/01/17  08:00    


 


AST  58 U/L (15-37)  H D 10/01/17  08:00    


 


ALT  66 U/L (12-78)   10/01/17  08:00    


 


Alkaline Phosphatase  70 U/L ()   10/01/17  08:00    


 


Total Protein  6.9 g/dl (6.4-8.2)   10/01/17  08:00    


 


Albumin  3.7 g/dl (3.4-5.0)   10/01/17  08:00    


 


Urine Color  Ltyellow   10/01/17  17:00    


 


Urine Appearance  Slcloudy   10/01/17  17:00    


 


Urine pH  5.0  (5.0-8.0)   10/01/17  17:00    


 


Ur Specific Gravity  1.020  (1.005-1.025)   10/01/17  17:00    


 


Urine Protein  Negative  (NEGATIVE)   10/01/17  17:00    


 


Urine Glucose (UA)  Negative  (NEGATIVE)   10/01/17  17:00    


 


Urine Ketones  Negative  (NEGATIVE)   10/01/17  17:00    


 


Urine Blood  Negative  (NEGATIVE)   10/01/17  17:00    


 


Urine Nitrite  Negative  (NEGATIVE)   10/01/17  17:00    


 


Urine Bilirubin  Negative  (NEGATIVE)   10/01/17  17:00    


 


Urine Urobilinogen  Negative mg/dL (0.2-1.0)   10/01/17  17:00    


 


RPR Titer  Nonreactive  (NONREACTIVE)   10/01/17  08:00    


 


HIV 1&2 Antibody Screen  Negative   10/01/17  08:00    


 


HIV P24 Antigen  Negative   10/01/17  08:00    











Assessment: 





10/03/17 10:24


WITHDRAWAL SYMPTOM


Plan: 


CONTINUE DETOX

## 2017-10-04 VITALS — DIASTOLIC BLOOD PRESSURE: 105 MMHG | HEART RATE: 103 BPM | SYSTOLIC BLOOD PRESSURE: 139 MMHG | TEMPERATURE: 97.7 F

## 2017-10-04 NOTE — DS
[facility] Detox Discharge Summary


Admission Date: 


09/30/17








- History


Present History: Sedative Dependence


Additional Comments: 


follow up with after care program as arrangement


Pertinent Past History: 


nicotine dependence


seizure


arthritis


bipolar 1 disorder





- Physical Exam Results


Vital Signs: 


 Vital Signs











Temperature  97.9 F   10/04/17 06:00


 


Pulse Rate  57 L  10/04/17 06:00


 


Respiratory Rate  18   10/04/17 06:00


 


Blood Pressure  111/66   10/04/17 06:00


 


O2 Sat by Pulse Oximetry (%)      











Pertinent Admission Physical Exam Findings: 


withdrawal symptom





- Treatment


Hospital Course: Detox Protocol Followed, Detoxed Safely, Responded well, 

Discharged Condition Good


Patient has Accepted a Rehab Referral to: declined





- Medication


Discharge Medications: 


Ambulatory Orders





Sertraline HCl [Zoloft -] 100 mg PO DAILY #30 tablet 04/16/16 


Quetiapine Fumarate [Seroquel -] 400 mg PO HS #30 tab 02/27/17 


Quetiapine Fumarate [Seroquel -] 400 mg PO HS #30 tab 05/24/17 


Quetiapine Fumarate [Seroquel -] 200 mg PO HS #30 tab 10/02/17 


Sertraline HCl [Zoloft -] 100 mg PO DAILY #30 tablet 10/02/17 











- Diagnosis


(1) Alcohol dependence with withdrawal, uncomplicated


Current Visit: No   Status: Acute





(2) Arthritis


Current Visit: Yes   Status: Chronic





(3) Seizure


Current Visit: Yes   Status: Chronic   Qualifiers: 


     Convulsion type: unspecified        Qualified Code(s): R56.9 - Unspecified 

convulsions; R56.9 - Unspecified convulsions; R56.9 - Unspecified convulsions; 

R56.9 - Unspecified convulsions  





(4) Bipolar 1 disorder


Current Visit: No   Status: Chronic








- AMA


Did Patient Leave Against Medical Advice: No

## 2017-12-07 ENCOUNTER — HOSPITAL ENCOUNTER (INPATIENT)
Dept: HOSPITAL 74 - YASAS | Age: 47
LOS: 4 days | Discharge: OTHER INSTITUTION | End: 2017-12-11
Attending: INTERNAL MEDICINE | Admitting: INTERNAL MEDICINE
Payer: COMMERCIAL

## 2017-12-07 VITALS — BODY MASS INDEX: 29.9 KG/M2

## 2017-12-07 DIAGNOSIS — M12.9: ICD-10-CM

## 2017-12-07 DIAGNOSIS — F32.9: ICD-10-CM

## 2017-12-07 DIAGNOSIS — F10.230: Primary | ICD-10-CM

## 2017-12-07 DIAGNOSIS — F19.24: ICD-10-CM

## 2017-12-07 DIAGNOSIS — F19.282: ICD-10-CM

## 2017-12-07 DIAGNOSIS — F31.81: ICD-10-CM

## 2017-12-07 DIAGNOSIS — S40.212A: ICD-10-CM

## 2017-12-07 DIAGNOSIS — F41.8: ICD-10-CM

## 2017-12-07 DIAGNOSIS — S00.81XA: ICD-10-CM

## 2017-12-07 LAB
APPEARANCE UR: (no result)
BACTERIA #/AREA URNS HPF: (no result) /HPF
BILIRUB UR STRIP.AUTO-MCNC: NEGATIVE MG/DL
COLOR UR: YELLOW
HIV 1 & 2 AB: NEGATIVE
HIV 1 AGP24: NEGATIVE
KETONES UR QL STRIP: (no result)
LEUKOCYTE ESTERASE UR QL STRIP.AUTO: (no result)
LEUKOCYTE ESTERASE UR QL STRIP: (no result)
MUCOUS THREADS URNS QL MICRO: (no result)
NITRITE UR QL STRIP: NEGATIVE
PH UR: 6 [PH] (ref 5–8)
PROT UR QL STRIP: NEGATIVE
PROT UR QL STRIP: NEGATIVE
RBC # UR STRIP: NEGATIVE /UL
RBC #/AREA URNS HPF: 1 /HPF (ref 0–3)
SP GR UR: 1.01 (ref 1–1.03)
UROBILINOGEN UR STRIP-MCNC: 2 MG/DL (ref 0.2–1)
WBC #/AREA URNS HPF: 2 /HPF (ref 3–5)

## 2017-12-07 PROCEDURE — HZ2ZZZZ DETOXIFICATION SERVICES FOR SUBSTANCE ABUSE TREATMENT: ICD-10-PCS | Performed by: INTERNAL MEDICINE

## 2017-12-07 RX ADMIN — Medication SCH MG: at 22:15

## 2017-12-07 RX ADMIN — BACITRACIN ZINC SCH GM: 500 OINTMENT TOPICAL at 13:54

## 2017-12-07 RX ADMIN — BACITRACIN ZINC SCH GM: 500 OINTMENT TOPICAL at 22:15

## 2017-12-07 NOTE — CONSULT
[facility] Psychiatric Consult





- Data


Date of interview: 12/07/17


Admission source: [facility]


Identifying data: This is 47 years old male with no psychiatric hospitalization 

history intoxicated with:  Alcohol, Nicotine


Substance Abuse History: - Smoking Cessation.  Smoking history: Current every 

day smoker.  Have you smoked in the past 12 months: No.  Aproximately how many 

cigarettes per day: 5.  Cigars Per Day: 0.  Hx Chewing Tobacco Use: No.  

Initiated information on smoking cessation: Yes.  'Breaking Loose' booklet given

: 12/07/17.  - Substance & Tx. History.  Hx Alcohol Use: Yes.  Hx Substance Use

: No.  Substance Use Type: Alcohol.  Hx Substance Use Treatment: Yes.  - 

Substances Abused.  ** Alcohol-voidka/beer.  Route: Oral.  Frequency: Daily.  

Amount used: 2-3 pts./2-6 pks.  Age of first use: 12.  Date of Last Use: 12/07/ 17


Medical History: Seizure history.  Hyperlipidemia


Psychiatric History: Patient reports history of depression and anxiety, reports 

taking prior to admission:  Zoloft 100mg [popqd.  Seroquel 400mg po qhs


Physical/Sexual Abuse/Trauma History: Denies


Additional Comment: Zoloft 100mg [popqd.  Seroquel 400mg po qhs





Mental Status Exam





- Mental Status Exam


Alert and Oriented to: Person


Cognitive Function: Fair


Patient Appearance: Unkempt


Mood: Sad


Affect: Mood Congruent


Patient Behavior: Appropriate


Speech Pattern: Appropriate


Voice Loudness: Normal


Thought Process: Circumstantial, Goal Oriented


Thought Disorder: Being Controlled


Hallucinations: Denies


Suicidal Ideation: Denies


Homicidal Ideation: Denies


Insight/Judgement: Fair


Sleep: Difficulty falling asleep


Appetite: Weight loss


Muscle strength/Tone: Mild Hypotonicity


Gait/Station: Shuffling


Additional Comments: Zoloft 100mg [popqd.  Seroquel 400mg po qhs





Psychiatric Findings





- Problem List (Axis 1, 2,3)


(1) Depression


Current Visit: Yes   Status: Chronic   





(2) Nicotine dependence


Current Visit: Yes   Status: Chronic   


Qualifiers: 


   Nicotine product type: cigarettes   Substance use status: uncomplicated   

Qualified Code(s): F17.210 - Nicotine dependence, cigarettes, uncomplicated   





(3) Alcohol intoxication


Current Visit: No   Status: Acute   


Qualifiers: 


   Complication of substance-induced condition: uncomplicated   Qualified Code(s

): F10.920 - Alcohol use, unspecified with intoxication, uncomplicated   





(4) Drug-induced mood disorder


Current Visit: No   Status: Acute   





(5) Substance induced mood disorder


Current Visit: No   Status: Acute   





(6) Substance-induced sleep disorder


Current Visit: No   Status: Acute   





(7) Alcohol dependence in controlled environment


Current Visit: No   Status: Chronic   





(8) Bipolar II disorder


Current Visit: No   Status: Suspected   





- Initial Treatment Plan


Initial Treatment Plan: Zoloft 100mg [popqd.  Seroquel 400mg po qhs

## 2017-12-07 NOTE — HP
CIWA Score





- CIWA Score


Nausea/Vomitin-No Nausea/No Vomiting


Muscle Tremors: 4-Moderate,w/Arms Extend


Anxiety: 4-Mod. Anxious/Guarded


Agitation: 4-Moderately Restless


Paroxysmal Sweats: 3


Orientation: 0-Oriented


Tacttile Disturbances: 0-None


Auditory Disturbances: 0-None


Visual Disturbances: 0-None


Headache: 1-Very Mild


CIWA-Ar Total Score: 16





Admission ROS [facility]





- HPI


Chief Complaint: 





I am here for detox. 


Allergies/Adverse Reactions: 


 Allergies











Allergy/AdvReac Type Severity Reaction Status Date / Time


 


No Known Allergies Allergy   Verified 17 10:13











History of Present Illness: 





pt is a 47yr old male with a history of alcohol dependence seeking detox for 

treatment. pt was at [facility] for a fall he experience d/t drinking 

excessively pt injured his left shoulder, pt was evaluated and released form ED 

to enter detox at our facility.


Exam Limitations: No Limitations





- Ebola screening


Have you traveled outside of the country in the last 21 days: No


Have you had contact with anyone from an Ebola affected area: No


Have you been sick,other than usual withdrawal symptoms: No


Do you have a fever: No





- Review of Systems


Constitutional: Chills, Diaphoresis, Loss of Appetite


EENT: reports: No Symptoms Reported


Respiratory: reports: No Symptoms reported


Cardiac: reports: Syncope


GI: reports: Diarrhea, Nausea, Poor Appetite, Poor Fluid Intake


: reports: No Symptoms Reported (left shoulder pain d/t fall yesterday)


Musculoskeletal: reports: Joint Pain


Integumentary: reports: Bruising (to right side of forehead, cheek, nose and 

left shoulder. all d/t fall), Flushing


Neuro: reports: Headache, Tingling, Tremors


Endocrine: reports: Excessive Sweating, Flushing, Intolerance to Heat, 

Increased Hunger


Hematology: reports: No Symptoms Reported


Psychiatric: reports: Judgement Intact, Mood/Affect Appropiate, Orientated x3, 

Agitated, Anxious


Other Systems: Reviewed and Negative





Patient History





- Patient Medical History


Hx Anemia: No


Hx Asthma: No


Hx Chronic Obstructive Pulmonary Disease (COPD): No


Hx Cancer: No


Hx Cardiac Disorders: No


Hx Congestive Heart Failure: No


Hx Hypertension: No


Hx Hypercholesterolemia: Yes (NO MEDS)


Hx Pacemaker: No


HX Cerebrovascular Accident: Yes (2016)


Hx Seizures: No


Hx Dementia: No


Hx Diabetes: No


Hx Gastrointestinal Disorders: No


Hx Liver Disease: No


Hx Genitourinary Disorders: No


Hx Sexually Transmitted Disorders: No


Hx Renal Disease (ESRD): No


Hx Thyroid Disease: No


Hx Human Immunodeficiency Virus (HIV): No (NEGATIVE HX )


Hx Hepatitis C: No


Hx Depression: Yes


Hx Suicide Attempt: No


Hx Bipolar Disorder: Yes (on Seroquel and Zoloft as per Hx.)


Hx Schizophrenia: No





- Patient Surgical History


Past Surgical History: No


Hx Neurologic Surgery: No


Hx Cataract Extraction: No


Hx Cardiac Surgery: No


Hx Lung Surgery: No


Hx Breast Surgery: No


Hx Breast Biopsy: No


Hx Abdominal Surgery: No


Hx Appendectomy: No


Hx Cholecystectomy: No


Hx Genitourinary Surgery: No


Hx  Section: No


Hx Orthopedic Surgery: No


Anesthesia Reaction: No





- PPD History


Previous Implant?: Yes


Documented Results: Positive w/o proof


Results: CXR neg 2017





- Reproductive History


Patient is a Female of Child Bearing Age (11 -55 yrs old): No





- Smoking Cessation


Smoking history: Current every day smoker


Have you smoked in the past 12 months: No


Aproximately how many cigarettes per day: 5


Cigars Per Day: 0


Hx Chewing Tobacco Use: No


Initiated information on smoking cessation: Yes


'Breaking Loose' booklet given: 17





- Substance & Tx. History


Hx Alcohol Use: Yes


Hx Substance Use: No


Substance Use Type: Alcohol


Hx Substance Use Treatment: Yes





- Substances Abused


  ** Alcohol-voidka/beer


Route: Oral


Frequency: Daily


Amount used: 2-3 pts./2-6 pks.


Age of first use: 12


Date of Last Use: 17





Family Disease History





- Family Disease History


Family Disease History: Diabetes: Mother (HTN), Other: Father (alcohol,)

, Mother





Admission Physical Exam [facility]





- Vital Signs


Vital Signs: 


 Vital Signs - 24 hr











  17





  09:55


 


Temperature 98.5 F


 


Pulse Rate 128 H


 


Respiratory 18





Rate 


 


Blood Pressure 132/98














- Physical


General Appearance: Yes: Appropriately Dressed, Moderate Distress, Tremorous, 

Irritable, Sweating, Anxious


HEENTM: Yes: Hearing grossly Normal, Normocephalic, Normal Voice, Nasal 

Congestion, Rhinorrhea


Respiratory: Yes: Lungs Clear, Normal Breath Sounds, No Respiratory Distress


Neck: Yes: No masses,lesions,Nodules


Breast: Yes: Within Normal Limits


Cardiology: Yes: Regular Rhythm, Regular Rate, S1, S2


Abdominal: Yes: Normal Bowel Sounds, Non Tender, Soft


Genitourinary: Yes: Within Normal Limits


Back: Yes: Normal Inspection


Musculoskeletal: Yes: Back pain, Muscle Pain


Extremities: Yes: Normal Capillary Refill, Normal Inspection, Non-Tender, 

Tremors


Neurological: Yes: Fully Oriented, Alert, Normal Response


Integumentary: Yes: Normal Color, Diaphoresis


Lymphatic: Yes: Within Normal Limits





- Diagnostic


(1) Alcohol dependence with withdrawal, uncomplicated


Current Visit: Yes   Status: Chronic   





(2) Depression


Current Visit: Yes   Status: Chronic   





(3) Nicotine dependence


Current Visit: Yes   Status: Chronic   


Qualifiers: 


   Nicotine product type: cigarettes   Substance use status: uncomplicated   

Qualified Code(s): F17.210 - Nicotine dependence, cigarettes, uncomplicated   





(4) Seizure


Current Visit: No   Status: Suspected   


Qualifiers: 


 





(5) Hyperlipidemia


Current Visit: Yes   Status: Chronic   


Qualifiers: 


   Hyperlipidemia type: pure hypercholesterolemia   Qualified Code(s): E78.00 - 

Pure hypercholesterolemia, unspecified; E78.0 - Pure hypercholesterolemia   


Comment: NO CURRENT MED   





(6) Shoulder abrasion


Current Visit: Yes   Status: Acute   


Qualifiers: 


   Encounter type: initial encounter   Laterality: left   Qualified Code(s): 

S40.212A - Abrasion of left shoulder, initial encounter   





(7) Facial abrasion


Current Visit: Yes   Status: Acute   


Qualifiers: 


   Encounter type: initial encounter   Qualified Code(s): S00.81XA - Abrasion 

of other part of head, initial encounter   





Cleared for Admission [facility]





- Detox or Rehab


[facility] Level of Care: Medically Managed


Detox Regimen/Protocol: Librium





[facility] Breath Alcohol Content


Breath Alcohol Content: 0.127





Urine Drug Screen





- Results


Drug Screen Negative: No


Urine Drug Screen Results: BZO-Benzodiazepines, TCA-Tricyclic Antidepress

## 2017-12-08 LAB
ALBUMIN SERPL-MCNC: 4.2 G/DL (ref 3.4–5)
ALP SERPL-CCNC: 89 U/L (ref 45–117)
ALT SERPL-CCNC: 34 U/L (ref 12–78)
ANION GAP SERPL CALC-SCNC: 11 MMOL/L (ref 8–16)
AST SERPL-CCNC: 34 U/L (ref 15–37)
BILIRUB SERPL-MCNC: 0.8 MG/DL (ref 0.2–1)
CALCIUM SERPL-MCNC: 9.2 MG/DL (ref 8.5–10.1)
CO2 SERPL-SCNC: 26 MMOL/L (ref 21–32)
CREAT SERPL-MCNC: 0.9 MG/DL (ref 0.7–1.3)
DEPRECATED RDW RBC AUTO: 13.1 % (ref 11.9–15.9)
GLUCOSE SERPL-MCNC: 152 MG/DL (ref 74–106)
MCH RBC QN AUTO: 34.2 PG (ref 25.7–33.7)
MCHC RBC AUTO-ENTMCNC: 34 G/DL (ref 32–35.9)
MCV RBC: 100.5 FL (ref 80–96)
PLATELET # BLD AUTO: 197 K/MM3 (ref 134–434)
PMV BLD: 8.6 FL (ref 7.5–11.1)
PROT SERPL-MCNC: 8.1 G/DL (ref 6.4–8.2)
WBC # BLD AUTO: 7.9 K/MM3 (ref 4–10)

## 2017-12-08 RX ADMIN — NICOTINE SCH: 21 PATCH TRANSDERMAL at 10:36

## 2017-12-08 RX ADMIN — BACITRACIN ZINC SCH GM: 500 OINTMENT TOPICAL at 10:35

## 2017-12-08 RX ADMIN — BACITRACIN ZINC SCH GM: 500 OINTMENT TOPICAL at 22:29

## 2017-12-08 RX ADMIN — SERTRALINE HYDROCHLORIDE SCH MG: 50 TABLET ORAL at 10:35

## 2017-12-08 RX ADMIN — Medication SCH TAB: at 10:35

## 2017-12-08 RX ADMIN — Medication SCH MG: at 22:29

## 2017-12-08 NOTE — EKG
Test Reason : 

Blood Pressure : ***/*** mmHG

Vent. Rate : 103 BPM     Atrial Rate : 103 BPM

   P-R Int : 164 ms          QRS Dur : 090 ms

    QT Int : 354 ms       P-R-T Axes : [phone] degrees

   QTc Int : 463 ms

 

SINUS TACHYCARDIA

OTHERWISE NORMAL ECG

WHEN COMPARED WITH ECG OF 01-OCT-2017 08:09,

NO SIGNIFICANT CHANGE WAS FOUND

Confirmed by MD HENNY, BILLIE ([id number]) on 12/8/2017 10:50:52 AM

 

Referred By: MARY ANDRADE           Confirmed By:BILLIE INMAN MD

## 2017-12-08 NOTE — PN
S CIWA





- CIWA Score


Nausea/Vomitin-No Nausea/No Vomiting


Muscle Tremors: 3


Anxiety: 4-Mod. Anxious/Guarded


Agitation: 3


Paroxysmal Sweats: 3


Orientation: 2-Disoriented Date<2 days


Tacttile Disturbances: 2-Mild Itch/Numbness/Burn


Auditory Disturbances: 0-None


Visual Disturbances: 0-None


Headache: 3-Moderate


CIWA-Ar Total Score: 20





[facility] Progress Note (SOAP)


Subjective: 





Tremors, Anxious, Sweating, Interrupted Sleep, H/A.


Objective: 


PT. A & O X 2 (UNCERTAIN ABOUT DAY / DATE). PT. OBSERVED AMBULATING ON UNIT. NO 

ACUTE DISTRESS.





17 13:19


 Vital Signs











Temperature  99.9 F H  17 13:00


 


Pulse Rate  114 H  17 13:00


 


Respiratory Rate  18   17 13:00


 


Blood Pressure  142/98   17 13:00


 


O2 Sat by Pulse Oximetry (%)      








 Laboratory Tests











  17





  12:20 15:15 06:00


 


WBC    7.9


 


RBC    4.16


 


Hgb    14.2  D


 


Hct    41.8


 


MCV    100.5 H


 


MCH    34.2 H


 


MCHC    34.0


 


RDW    13.1


 


Plt Count    197  D


 


MPV    8.6


 


Sodium   


 


Potassium   


 


Chloride   


 


Carbon Dioxide   


 


Anion Gap   


 


BUN   


 


Creatinine   


 


Creat Clearance w eGFR   


 


Random Glucose   


 


Calcium   


 


Total Bilirubin   


 


AST   


 


ALT   


 


Alkaline Phosphatase   


 


Total Protein   


 


Albumin   


 


Urine Color   Yellow 


 


Urine Appearance   Slcloudy 


 


Urine pH   6.0 


 


Ur Specific [place]   1.011 


 


Urine Protein   Negative 


 


Urine Glucose (UA)   Negative 


 


Urine Ketones   1+ H 


 


Urine Blood   Negative 


 


Urine Nitrite   Negative 


 


Urine Bilirubin   Negative 


 


Urine Urobilinogen   2.0 


 


Ur Leukocyte Esterase   Trace H 


 


Urine WBC (Auto)   2 


 


Urine RBC (Auto)   1 


 


Ur Epithelial Cells   Rare 


 


Urine Bacteria   Many 


 


Urine Mucus   Rare 


 


RPR Titer   


 


HIV 1&2 Antibody Screen  Negative  


 


HIV P24 Antigen  Negative  














  17





  06:00 06:00


 


WBC  


 


RBC  


 


Hgb  


 


Hct  


 


MCV  


 


MCH  


 


MCHC  


 


RDW  


 


Plt Count  


 


MPV  


 


Sodium  135 L 


 


Potassium  4.2 


 


Chloride  98 


 


Carbon Dioxide  26 


 


Anion Gap  11 


 


BUN  9  D 


 


Creatinine  0.9  D 


 


Creat Clearance w eGFR  > 60 


 


Random Glucose  152 H D 


 


Calcium  9.2 


 


Total Bilirubin  0.8 


 


AST  34  D 


 


ALT  34  D 


 


Alkaline Phosphatase  89  D 


 


Total Protein  8.1 


 


Albumin  4.2 


 


Urine Color  


 


Urine Appearance  


 


Urine pH  


 


Ur Specific Gravity  


 


Urine Protein  


 


Urine Glucose (UA)  


 


Urine Ketones  


 


Urine Blood  


 


Urine Nitrite  


 


Urine Bilirubin  


 


Urine Urobilinogen  


 


Ur Leukocyte Esterase  


 


Urine WBC (Auto)  


 


Urine RBC (Auto)  


 


Ur Epithelial Cells  


 


Urine Bacteria  


 


Urine Mucus  


 


RPR Titer   Nonreactive


 


HIV 1&2 Antibody Screen  


 


HIV P24 Antigen  








LABS NOTED.


Assessment: 





17 13:20


WITHDRAWAL SYMPTOMS.


Plan: 





CONTINUE DETOX.


B[facility] ACBK FOR ELEVATED ADMISSION RANDOM GLUCOSE LEVEL.


CLONIDINE, 0.1 MG PO FOR DETOX SYMPTOMS AND FOR ELEVATED BP.


INCREASE DAILY PO FLUID INTAKE.

## 2017-12-09 RX ADMIN — NICOTINE SCH: 21 PATCH TRANSDERMAL at 10:28

## 2017-12-09 RX ADMIN — IBUPROFEN PRN MG: 400 TABLET, FILM COATED ORAL at 12:38

## 2017-12-09 RX ADMIN — BACITRACIN ZINC SCH GM: 500 OINTMENT TOPICAL at 22:23

## 2017-12-09 RX ADMIN — BACITRACIN ZINC SCH GM: 500 OINTMENT TOPICAL at 10:28

## 2017-12-09 RX ADMIN — Medication SCH MG: at 22:23

## 2017-12-09 RX ADMIN — SERTRALINE HYDROCHLORIDE SCH MG: 50 TABLET ORAL at 10:28

## 2017-12-09 RX ADMIN — Medication SCH TAB: at 10:28

## 2017-12-09 NOTE — PN
[facility] CIWA





- CIWA Score


Nausea/Vomitin-No Nausea/No Vomiting


Muscle Tremors: 3


Anxiety: 5


Agitation: 4-Moderately Restless


Paroxysmal Sweats: 4-Forehead w/Sweat Beads


Orientation: 0-Oriented


Tacttile Disturbances: 2-Mild Itch/Numbness/Burn


Auditory Disturbances: 0-None


Visual Disturbances: 0-None


Headache: 0-None Present


CIWA-Ar Total Score: 18





[facility] Progress Note (SOAP)


Subjective: 





Tremors, Diarrhea, Body Aches, Sweating, Interrupted Sleep.


Objective: 


PT. A & O X 3, OBSERVED AMBULATING ON UNIT. NO ACUTE DISTRESS.





17 16:04


 Vital Signs











Temperature  98.4 F   17 15:07


 


Pulse Rate  109 H  17 15:07


 


Respiratory Rate  19   17 15:07


 


Blood Pressure  125/90   17 15:07


 


O2 Sat by Pulse Oximetry (%)      








 Laboratory Tests











  17





  12:20 15:15 06:00


 


WBC    7.9


 


RBC    4.16


 


Hgb    14.2  D


 


Hct    41.8


 


MCV    100.5 H


 


MCH    34.2 H


 


MCHC    34.0


 


RDW    13.1


 


Plt Count    197  D


 


MPV    8.6


 


Sodium   


 


Potassium   


 


Chloride   


 


Carbon Dioxide   


 


Anion Gap   


 


BUN   


 


Creatinine   


 


Creat Clearance w eGFR   


 


POC Glucometer   


 


Random Glucose   


 


Calcium   


 


Total Bilirubin   


 


AST   


 


ALT   


 


Alkaline Phosphatase   


 


Total Protein   


 


Albumin   


 


Urine Color   Yellow 


 


Urine Appearance   Slcloudy 


 


Urine pH   6.0 


 


Ur Specific [place]   1.011 


 


Urine Protein   Negative 


 


Urine Glucose (UA)   Negative 


 


Urine Ketones   1+ H 


 


Urine Blood   Negative 


 


Urine Nitrite   Negative 


 


Urine Bilirubin   Negative 


 


Urine Urobilinogen   2.0 


 


Ur Leukocyte Esterase   Trace H 


 


Urine WBC (Auto)   2 


 


Urine RBC (Auto)   1 


 


Ur Epithelial Cells   Rare 


 


Urine Bacteria   Many 


 


Urine Mucus   Rare 


 


RPR Titer   


 


HIV 1&2 Antibody Screen  Negative  


 


HIV P24 Antigen  Negative  














  17





  06:00 06:00 05:40


 


WBC   


 


RBC   


 


Hgb   


 


Hct   


 


MCV   


 


MCH   


 


MCHC   


 


RDW   


 


Plt Count   


 


MPV   


 


Sodium  135 L  


 


Potassium  4.2  


 


Chloride  98  


 


Carbon Dioxide  26  


 


Anion Gap  11  


 


BUN  9  D  


 


Creatinine  0.9  D  


 


Creat Clearance w eGFR  > 60  


 


POC Glucometer    100


 


Random Glucose  152 H D  


 


Calcium  9.2  


 


Total Bilirubin  0.8  


 


AST  34  D  


 


ALT  34  D  


 


Alkaline Phosphatase  89  D  


 


Total Protein  8.1  


 


Albumin  4.2  


 


Urine Color   


 


Urine Appearance   


 


Urine pH   


 


Ur Specific Gravity   


 


Urine Protein   


 


Urine Glucose (UA)   


 


Urine Ketones   


 


Urine Blood   


 


Urine Nitrite   


 


Urine Bilirubin   


 


Urine Urobilinogen   


 


Ur Leukocyte Esterase   


 


Urine WBC (Auto)   


 


Urine RBC (Auto)   


 


Ur Epithelial Cells   


 


Urine Bacteria   


 


Urine Mucus   


 


RPR Titer   Nonreactive 


 


HIV 1&2 Antibody Screen   


 


HIV P24 Antigen   








LABS NOTED.


Assessment: 





17 16:07


WITHDRAWAL SYMPTOMS.


Plan: 





CONTINUE DETOX.


PRN FLEXERIL FOR BODY ACHES / MUSCLE SPASMS.


INCREASE DAILY PO FLUID INTAKE.

## 2017-12-10 RX ADMIN — BACITRACIN ZINC SCH GM: 500 OINTMENT TOPICAL at 10:26

## 2017-12-10 RX ADMIN — IBUPROFEN PRN MG: 400 TABLET, FILM COATED ORAL at 14:58

## 2017-12-10 RX ADMIN — NICOTINE SCH: 21 PATCH TRANSDERMAL at 10:26

## 2017-12-10 RX ADMIN — BACITRACIN ZINC SCH GM: 500 OINTMENT TOPICAL at 22:40

## 2017-12-10 RX ADMIN — Medication SCH TAB: at 10:26

## 2017-12-10 RX ADMIN — Medication SCH MG: at 22:40

## 2017-12-10 RX ADMIN — SERTRALINE HYDROCHLORIDE SCH MG: 50 TABLET ORAL at 10:26

## 2017-12-10 NOTE — PN
[facility] Progress Note (SOAP)


Subjective: 





Tremor, chills, sweat, diarrhea


Objective: 





12/10/17 16:57


 Last Vital Signs











Temp Pulse Resp BP Pulse Ox


 


 99.1 F   98 H  18   123/88    


 


 12/10/17 10:55  12/10/17 10:55  12/10/17 14:28  12/10/17 10:55   








 Laboratory Tests











  12/07/17 12/07/17 12/08/17





  12:20 15:15 06:00


 


WBC    7.9


 


RBC    4.16


 


Hgb    14.2  D


 


Hct    41.8


 


MCV    100.5 H


 


MCH    34.2 H


 


MCHC    34.0


 


RDW    13.1


 


Plt Count    197  D


 


MPV    8.6


 


Sodium   


 


Potassium   


 


Chloride   


 


Carbon Dioxide   


 


Anion Gap   


 


BUN   


 


Creatinine   


 


Creat Clearance w eGFR   


 


POC Glucometer   


 


Random Glucose   


 


Calcium   


 


Total Bilirubin   


 


AST   


 


ALT   


 


Alkaline Phosphatase   


 


Total Protein   


 


Albumin   


 


Urine Color   Yellow 


 


Urine Appearance   Slcloudy 


 


Urine pH   6.0 


 


Ur Specific [place]   1.011 


 


Urine Protein   Negative 


 


Urine Glucose (UA)   Negative 


 


Urine Ketones   1+ H 


 


Urine Blood   Negative 


 


Urine Nitrite   Negative 


 


Urine Bilirubin   Negative 


 


Urine Urobilinogen   2.0 


 


Ur Leukocyte Esterase   Trace H 


 


Urine WBC (Auto)   2 


 


Urine RBC (Auto)   1 


 


Ur Epithelial Cells   Rare 


 


Urine Bacteria   Many 


 


Urine Mucus   Rare 


 


RPR Titer   


 


HIV 1&2 Antibody Screen  Negative  


 


HIV P24 Antigen  Negative  














  12/08/17 12/08/17 12/09/17





  06:00 06:00 05:40


 


WBC   


 


RBC   


 


Hgb   


 


Hct   


 


MCV   


 


MCH   


 


MCHC   


 


RDW   


 


Plt Count   


 


MPV   


 


Sodium  135 L  


 


Potassium  4.2  


 


Chloride  98  


 


Carbon Dioxide  26  


 


Anion Gap  11  


 


BUN  9  D  


 


Creatinine  0.9  D  


 


Creat Clearance w eGFR  > 60  


 


POC Glucometer    100


 


Random Glucose  152 H D  


 


Calcium  9.2  


 


Total Bilirubin  0.8  


 


AST  34  D  


 


ALT  34  D  


 


Alkaline Phosphatase  89  D  


 


Total Protein  8.1  


 


Albumin  4.2  


 


Urine Color   


 


Urine Appearance   


 


Urine pH   


 


Ur Specific [place]   


 


Urine Protein   


 


Urine Glucose (UA)   


 


Urine Ketones   


 


Urine Blood   


 


Urine Nitrite   


 


Urine Bilirubin   


 


Urine Urobilinogen   


 


Ur Leukocyte Esterase   


 


Urine WBC (Auto)   


 


Urine RBC (Auto)   


 


Ur Epithelial Cells   


 


Urine Bacteria   


 


Urine Mucus   


 


RPR Titer   Nonreactive 


 


HIV 1&2 Antibody Screen   


 


HIV P24 Antigen   














  12/10/17





  05:56


 


WBC 


 


RBC 


 


Hgb 


 


Hct 


 


MCV 


 


MCH 


 


MCHC 


 


RDW 


 


Plt Count 


 


MPV 


 


Sodium 


 


Potassium 


 


Chloride 


 


Carbon Dioxide 


 


Anion Gap 


 


BUN 


 


Creatinine 


 


Creat Clearance w eGFR 


 


POC Glucometer  109


 


Random Glucose 


 


Calcium 


 


Total Bilirubin 


 


AST 


 


ALT 


 


Alkaline Phosphatase 


 


Total Protein 


 


Albumin 


 


Urine Color 


 


Urine Appearance 


 


Urine pH 


 


Ur Specific [place] 


 


Urine Protein 


 


Urine Glucose (UA) 


 


Urine Ketones 


 


Urine Blood 


 


Urine Nitrite 


 


Urine Bilirubin 


 


Urine Urobilinogen 


 


Ur Leukocyte Esterase 


 


Urine WBC (Auto) 


 


Urine RBC (Auto) 


 


Ur Epithelial Cells 


 


Urine Bacteria 


 


Urine Mucus 


 


RPR Titer 


 


HIV 1&2 Antibody Screen 


 


HIV P24 Antigen 








Labs noted


Assessment: 





12/10/17 16:58


Withdrawal symptoms


Plan: 





Continue detox

## 2017-12-11 ENCOUNTER — HOSPITAL ENCOUNTER (INPATIENT)
Dept: HOSPITAL 74 - YASAS | Age: 47
LOS: 15 days | Discharge: HOME | DRG: 772 | End: 2017-12-26
Attending: PSYCHIATRY & NEUROLOGY | Admitting: PSYCHIATRY & NEUROLOGY
Payer: COMMERCIAL

## 2017-12-11 VITALS — HEART RATE: 93 BPM | SYSTOLIC BLOOD PRESSURE: 112 MMHG | DIASTOLIC BLOOD PRESSURE: 81 MMHG | TEMPERATURE: 96.6 F

## 2017-12-11 VITALS — BODY MASS INDEX: 30.7 KG/M2

## 2017-12-11 DIAGNOSIS — F31.89: ICD-10-CM

## 2017-12-11 DIAGNOSIS — F17.210: ICD-10-CM

## 2017-12-11 DIAGNOSIS — F10.20: Primary | ICD-10-CM

## 2017-12-11 PROCEDURE — HZ42ZZZ GROUP COUNSELING FOR SUBSTANCE ABUSE TREATMENT, COGNITIVE-BEHAVIORAL: ICD-10-PCS | Performed by: PSYCHIATRY & NEUROLOGY

## 2017-12-11 PROCEDURE — G0009 ADMIN PNEUMOCOCCAL VACCINE: HCPCS

## 2017-12-11 RX ADMIN — NICOTINE SCH: 14 PATCH, EXTENDED RELEASE TRANSDERMAL at 20:23

## 2017-12-11 RX ADMIN — BACITRACIN ZINC SCH GM: 500 OINTMENT TOPICAL at 10:34

## 2017-12-11 RX ADMIN — Medication SCH MG: at 21:31

## 2017-12-11 RX ADMIN — SERTRALINE HYDROCHLORIDE SCH MG: 50 TABLET ORAL at 10:34

## 2017-12-11 RX ADMIN — Medication SCH TAB: at 10:34

## 2017-12-11 RX ADMIN — NICOTINE SCH: 21 PATCH TRANSDERMAL at 10:34

## 2017-12-11 NOTE — HP
[facility] MD Rehab Assess/Revision





- Admission History


Ad[facility] to Rehab from: [facility]


Date of Admission to Rehab: 12/11/17





- Vital signs


Vital Signs: 


 Vital Signs











 Period  Temp  Pulse  Resp  BP Sys/Zelaya  Pulse Ox


 


 Last 24 Hr  98.2 F  92  20  104/70  














- Findings


Detox History & Physical reviewed: Yes


Concur with findings: Yes


Comments/Additional Findings: PT ADMITTED TO [address] [place] [address] TODAY FOR REHAB.





Inpatient Rehab Admission





- Initial Determination


Are CD services needed?: Yes


Free of communicable disease: Yes


Not in need of hospitalization: Yes





- Rehab Admission Criteria


Patient is meeting Inpatient Rehab admission criteria:: Yes

## 2017-12-11 NOTE — PN
[facility]HS Progress Note


Note: 





Psychiatry


Attending on-call's note :


Called to enter orders for seroquel and sertraline.


46 y/o  male transferred today to [address] [place] [address].


From [address] [place] [address].Chart reviewed.Medications revisited.


Reconciled.Progress notes : appreciated.


Intervention :


Seroquel 200 mg po hs 


Zoloft 100 mg po daily


Orders entered.Continuity of care.

## 2017-12-11 NOTE — DS
[facility] Detox Discharge Summary


Admission Date: 


12/07/17





Discharge Date: 12/11/17





- History


Present History: Alcohol Dependence


Additional Comments: 





DETOX COMPLETED. ALERT O X 3. NAD. PT REFERRED TO REHAB TODAY.


Pertinent Past History: 





ARTHRITIS


HYPERLIPIDEMIA





- Physical Exam Results


Vital Signs: 


 Vital Signs











Temperature  96.6 F L  12/11/17 09:38


 


Pulse Rate  93 H  12/11/17 09:38


 


Respiratory Rate  18   12/11/17 09:38


 


Blood Pressure  112/81   12/11/17 09:38


 


O2 Sat by Pulse Oximetry (%)      











Pertinent Admission Physical Exam Findings: 





WITHDRAWAL SX


 Laboratory Last Values











WBC  7.9 K/mm3 (4.0-10.0)   12/08/17  06:00    


 


RBC  4.16 M/mm3 (4.00-5.60)   12/08/17  06:00    


 


Hgb  14.2 GM/dL (11.7-16.9)  D 12/08/17  06:00    


 


Hct  41.8 % (35.4-49)   12/08/17  06:00    


 


MCV  100.5 fl (80-96)  H  12/08/17  06:00    


 


MCH  34.2 pg (25.7-33.7)  H  12/08/17  06:00    


 


MCHC  34.0 g/dl (32.0-35.9)   12/08/17  06:00    


 


RDW  13.1 % (11.9-15.9)   12/08/17  06:00    


 


Plt Count  197 K/MM3 (134-434)  D 12/08/17  06:00    


 


MPV  8.6 fl (7.5-11.1)   12/08/17  06:00    


 


Sodium  135 mmol/L (136-145)  L  12/08/17  06:00    


 


Potassium  4.2 mmol/L (3.5-5.1)   12/08/17  06:00    


 


Chloride  98 mmol/L ()   12/08/17  06:00    


 


Carbon Dioxide  26 mmol/L (21-32)   12/08/17  06:00    


 


Anion Gap  11  (8-16)   12/08/17  06:00    


 


BUN  9 mg/dL (7-18)  D 12/08/17  06:00    


 


Creatinine  0.9 mg/dL (0.7-1.3)  D 12/08/17  06:00    


 


Creat Clearance w eGFR  > 60  (>60)   12/08/17  06:00    


 


POC Glucometer  109 UNITS ()   12/10/17  05:56    


 


Random Glucose  152 mg/dL ()  H D 12/08/17  06:00    


 


Calcium  9.2 mg/dL (8.5-10.1)   12/08/17  06:00    


 


Total Bilirubin  0.8 mg/dL (0.2-1.0)   12/08/17  06:00    


 


AST  34 U/L (15-37)  D 12/08/17  06:00    


 


ALT  34 U/L (12-78)  D 12/08/17  06:00    


 


Alkaline Phosphatase  89 U/L ()  D 12/08/17  06:00    


 


Total Protein  8.1 g/dl (6.4-8.2)   12/08/17  06:00    


 


Albumin  4.2 g/dl (3.4-5.0)   12/08/17  06:00    


 


Urine Color  Yellow   12/07/17  15:15    


 


Urine Appearance  Slcloudy   12/07/17  15:15    


 


Urine pH  6.0  (5.0-8.0)   12/07/17  15:15    


 


Ur Specific Gravity  1.011  (1.001-1.035)   12/07/17  15:15    


 


Urine Protein  Negative  (NEGATIVE)   12/07/17  15:15    


 


Urine Glucose (UA)  Negative  (NEGATIVE)   12/07/17  15:15    


 


Urine Ketones  1+  (NEGATIVE)  H  12/07/17  15:15    


 


Urine Blood  Negative  (NEGATIVE)   12/07/17  15:15    


 


Urine Nitrite  Negative  (NEGATIVE)   12/07/17  15:15    


 


Urine Bilirubin  Negative  (NEGATIVE)   12/07/17  15:15    


 


Urine Urobilinogen  2.0 mg/dL (0.2-1.0)   12/07/17  15:15    


 


Ur Leukocyte Esterase  Trace  (NEGATIVE)  H  12/07/17  15:15    


 


Urine WBC (Auto)  2 /hpf (3-5)   12/07/17  15:15    


 


Urine RBC (Auto)  1 /hpf (0-3)   12/07/17  15:15    


 


Ur Epithelial Cells  Rare /HPF (FEW)   12/07/17  15:15    


 


Urine Bacteria  Many /hpf (NONE SEEN)   12/07/17  15:15    


 


Urine Mucus  Rare   12/07/17  15:15    


 


RPR Titer  Nonreactive  (NONREACTIVE)   12/08/17  06:00    


 


HIV 1&2 Antibody Screen  Negative   12/07/17  12:20    


 


HIV P24 Antigen  Negative   12/07/17  12:20    














- Treatment


Hospital Course: Detox Protocol Followed, Detoxed Safely, Responded well, 

Discharged Condition Good, Rehab Referral Accepted


Patient has Accepted a Rehab Referral to: [facility] 





- Medication


Discharge Medications: 


Ambulatory Orders





Sertraline HCl [Zoloft -] 100 mg PO DAILY #30 tablet 04/16/16 


Quetiapine Fumarate [Seroquel -] 400 mg PO HS #30 tab 02/27/17 


Quetiapine Fumarate [Seroquel -] 200 mg PO HS #30 tab 12/07/17 


Sertraline HCl [Zoloft] 100 mg PO DAILY #30 tablet 12/07/17 











- Diagnosis


(1) Alcohol dependence with withdrawal, uncomplicated


Status: Acute   





(2) Arthritis


Status: Chronic   





(3) Hyperlipidemia


Status: Chronic   


Qualifiers: 


   Hyperlipidemia type: pure hypercholesterolemia   Qualified Code(s): E78.00 - 

Pure hypercholesterolemia, unspecified; E78.0 - Pure hypercholesterolemia   





(4) Nicotine dependence


Status: Chronic   


Qualifiers: 


   Nicotine product type: cigarettes   Substance use status: uncomplicated   

Qualified Code(s): F17.210 - Nicotine dependence, cigarettes, uncomplicated   





(5) Seizure


Status: Suspected   


Qualifiers: 


 





(6) Facial abrasion


Status: Acute   


Qualifiers: 


   Encounter type: initial encounter   Qualified Code(s): S00.81XA - Abrasion 

of other part of head, initial encounter   





(7) Shoulder abrasion


Status: Acute   


Qualifiers: 


   Encounter type: initial encounter   Laterality: left   Qualified Code(s): 

S40.212A - Abrasion of left shoulder, initial encounter   





- AMA


Did Patient Leave Against Medical Advice: No

## 2017-12-11 NOTE — DS
BHS Detox Discharge Summary


Admission Date: 


12/07/17





Discharge Date: 12/11/17





- History


Present History: Alcohol Dependence





- Physical Exam Results


Vital Signs: 


 Vital Signs











Temperature  96.6 F L  12/11/17 09:38


 


Pulse Rate  93 H  12/11/17 09:38


 


Respiratory Rate  18   12/11/17 09:38


 


Blood Pressure  112/81   12/11/17 09:38


 


O2 Sat by Pulse Oximetry (%)      














- Medication


Discharge Medications: 


Ambulatory Orders





Sertraline HCl [Zoloft -] 100 mg PO DAILY #30 tablet 04/16/16 


Quetiapine Fumarate [Seroquel -] 400 mg PO HS #30 tab 02/27/17 


Quetiapine Fumarate [Seroquel -] 200 mg PO HS #30 tab 12/07/17 


Sertraline HCl [Zoloft] 100 mg PO DAILY #30 tablet 12/07/17

## 2017-12-12 RX ADMIN — GABAPENTIN SCH MG: 100 CAPSULE ORAL at 15:41

## 2017-12-12 RX ADMIN — CYCLOBENZAPRINE HYDROCHLORIDE SCH MG: 10 TABLET, FILM COATED ORAL at 15:41

## 2017-12-12 RX ADMIN — GABAPENTIN SCH MG: 100 CAPSULE ORAL at 21:32

## 2017-12-12 RX ADMIN — NAPROXEN SCH MG: 500 TABLET ORAL at 21:33

## 2017-12-12 RX ADMIN — SERTRALINE HYDROCHLORIDE SCH MG: 50 TABLET ORAL at 10:00

## 2017-12-12 RX ADMIN — PANTOPRAZOLE SODIUM SCH MG: 40 TABLET, DELAYED RELEASE ORAL at 15:41

## 2017-12-12 RX ADMIN — CYCLOBENZAPRINE HYDROCHLORIDE SCH MG: 10 TABLET, FILM COATED ORAL at 21:32

## 2017-12-12 RX ADMIN — NICOTINE SCH: 14 PATCH, EXTENDED RELEASE TRANSDERMAL at 10:01

## 2017-12-12 RX ADMIN — ANALGESIC BALM SCH: 1.74; 4.06 OINTMENT TOPICAL at 15:38

## 2017-12-12 RX ADMIN — Medication SCH MG: at 21:32

## 2017-12-12 RX ADMIN — Medication SCH TAB: at 10:00
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
## 2017-12-13 RX ADMIN — CYCLOBENZAPRINE HYDROCHLORIDE SCH MG: 10 TABLET, FILM COATED ORAL at 14:17

## 2017-12-13 RX ADMIN — Medication SCH TAB: at 09:58

## 2017-12-13 RX ADMIN — ANALGESIC BALM SCH APPLIC: 1.74; 4.06 OINTMENT TOPICAL at 09:58

## 2017-12-13 RX ADMIN — NAPROXEN SCH MG: 500 TABLET ORAL at 21:20

## 2017-12-13 RX ADMIN — CYCLOBENZAPRINE HYDROCHLORIDE SCH MG: 10 TABLET, FILM COATED ORAL at 21:20

## 2017-12-13 RX ADMIN — SERTRALINE HYDROCHLORIDE SCH MG: 50 TABLET ORAL at 09:58

## 2017-12-13 RX ADMIN — GABAPENTIN SCH MG: 100 CAPSULE ORAL at 06:34

## 2017-12-13 RX ADMIN — Medication SCH MG: at 21:20

## 2017-12-13 RX ADMIN — NAPROXEN SCH MG: 500 TABLET ORAL at 09:58

## 2017-12-13 RX ADMIN — GABAPENTIN SCH MG: 100 CAPSULE ORAL at 14:17

## 2017-12-13 RX ADMIN — NICOTINE SCH: 14 PATCH, EXTENDED RELEASE TRANSDERMAL at 09:59

## 2017-12-13 RX ADMIN — CYCLOBENZAPRINE HYDROCHLORIDE SCH MG: 10 TABLET, FILM COATED ORAL at 06:34

## 2017-12-13 RX ADMIN — PANTOPRAZOLE SODIUM SCH MG: 40 TABLET, DELAYED RELEASE ORAL at 09:58

## 2017-12-13 RX ADMIN — GABAPENTIN SCH MG: 100 CAPSULE ORAL at 21:20

## 2017-12-14 RX ADMIN — Medication SCH TAB: at 09:55

## 2017-12-14 RX ADMIN — NAPROXEN SCH MG: 500 TABLET ORAL at 09:55

## 2017-12-14 RX ADMIN — CYCLOBENZAPRINE HYDROCHLORIDE SCH MG: 10 TABLET, FILM COATED ORAL at 06:28

## 2017-12-14 RX ADMIN — NICOTINE SCH: 14 PATCH, EXTENDED RELEASE TRANSDERMAL at 09:56

## 2017-12-14 RX ADMIN — CYCLOBENZAPRINE HYDROCHLORIDE SCH MG: 10 TABLET, FILM COATED ORAL at 21:22

## 2017-12-14 RX ADMIN — NAPROXEN SCH MG: 500 TABLET ORAL at 21:22

## 2017-12-14 RX ADMIN — SERTRALINE HYDROCHLORIDE SCH MG: 50 TABLET ORAL at 09:55

## 2017-12-14 RX ADMIN — GABAPENTIN SCH MG: 100 CAPSULE ORAL at 06:28

## 2017-12-14 RX ADMIN — GABAPENTIN SCH MG: 100 CAPSULE ORAL at 13:33

## 2017-12-14 RX ADMIN — Medication SCH MG: at 21:22

## 2017-12-14 RX ADMIN — CYCLOBENZAPRINE HYDROCHLORIDE SCH MG: 10 TABLET, FILM COATED ORAL at 13:33

## 2017-12-14 RX ADMIN — PANTOPRAZOLE SODIUM SCH MG: 40 TABLET, DELAYED RELEASE ORAL at 09:55

## 2017-12-14 RX ADMIN — ANALGESIC BALM SCH APPLIC: 1.74; 4.06 OINTMENT TOPICAL at 09:54

## 2017-12-14 RX ADMIN — GABAPENTIN SCH MG: 100 CAPSULE ORAL at 21:22

## 2017-12-15 RX ADMIN — NAPROXEN SCH MG: 500 TABLET ORAL at 09:36

## 2017-12-15 RX ADMIN — ANALGESIC BALM SCH: 1.74; 4.06 OINTMENT TOPICAL at 09:37

## 2017-12-15 RX ADMIN — GABAPENTIN SCH MG: 100 CAPSULE ORAL at 06:42

## 2017-12-15 RX ADMIN — SERTRALINE HYDROCHLORIDE SCH MG: 50 TABLET ORAL at 09:36

## 2017-12-15 RX ADMIN — CYCLOBENZAPRINE HYDROCHLORIDE SCH MG: 10 TABLET, FILM COATED ORAL at 21:33

## 2017-12-15 RX ADMIN — Medication SCH MG: at 21:33

## 2017-12-15 RX ADMIN — CYCLOBENZAPRINE HYDROCHLORIDE SCH MG: 10 TABLET, FILM COATED ORAL at 06:42

## 2017-12-15 RX ADMIN — NAPROXEN SCH MG: 500 TABLET ORAL at 21:33

## 2017-12-15 RX ADMIN — GABAPENTIN SCH MG: 100 CAPSULE ORAL at 21:33

## 2017-12-15 RX ADMIN — CYCLOBENZAPRINE HYDROCHLORIDE SCH MG: 10 TABLET, FILM COATED ORAL at 14:08

## 2017-12-15 RX ADMIN — GABAPENTIN SCH MG: 100 CAPSULE ORAL at 14:08

## 2017-12-15 RX ADMIN — Medication SCH TAB: at 09:36

## 2017-12-15 RX ADMIN — NICOTINE SCH: 14 PATCH, EXTENDED RELEASE TRANSDERMAL at 09:37

## 2017-12-15 RX ADMIN — PANTOPRAZOLE SODIUM SCH MG: 40 TABLET, DELAYED RELEASE ORAL at 09:36

## 2017-12-16 RX ADMIN — CYCLOBENZAPRINE HYDROCHLORIDE SCH MG: 10 TABLET, FILM COATED ORAL at 13:27

## 2017-12-16 RX ADMIN — NAPROXEN SCH MG: 500 TABLET ORAL at 21:17

## 2017-12-16 RX ADMIN — PANTOPRAZOLE SODIUM SCH MG: 40 TABLET, DELAYED RELEASE ORAL at 09:36

## 2017-12-16 RX ADMIN — NAPROXEN SCH MG: 500 TABLET ORAL at 09:36

## 2017-12-16 RX ADMIN — CYCLOBENZAPRINE HYDROCHLORIDE SCH MG: 10 TABLET, FILM COATED ORAL at 21:17

## 2017-12-16 RX ADMIN — GABAPENTIN SCH MG: 100 CAPSULE ORAL at 13:27

## 2017-12-16 RX ADMIN — SERTRALINE HYDROCHLORIDE SCH MG: 50 TABLET ORAL at 09:36

## 2017-12-16 RX ADMIN — CYCLOBENZAPRINE HYDROCHLORIDE SCH MG: 10 TABLET, FILM COATED ORAL at 06:06

## 2017-12-16 RX ADMIN — GABAPENTIN SCH MG: 100 CAPSULE ORAL at 06:06

## 2017-12-16 RX ADMIN — Medication SCH TAB: at 09:36

## 2017-12-16 RX ADMIN — Medication SCH MG: at 21:17

## 2017-12-16 RX ADMIN — GABAPENTIN SCH MG: 100 CAPSULE ORAL at 21:17

## 2017-12-16 RX ADMIN — NICOTINE SCH: 14 PATCH, EXTENDED RELEASE TRANSDERMAL at 09:36

## 2017-12-16 RX ADMIN — ANALGESIC BALM SCH APPLIC: 1.74; 4.06 OINTMENT TOPICAL at 09:36

## 2017-12-17 RX ADMIN — NAPROXEN SCH MG: 500 TABLET ORAL at 21:14

## 2017-12-17 RX ADMIN — CYCLOBENZAPRINE HYDROCHLORIDE SCH MG: 10 TABLET, FILM COATED ORAL at 06:31

## 2017-12-17 RX ADMIN — GABAPENTIN SCH MG: 100 CAPSULE ORAL at 14:09

## 2017-12-17 RX ADMIN — Medication SCH MG: at 21:14

## 2017-12-17 RX ADMIN — NICOTINE SCH: 14 PATCH, EXTENDED RELEASE TRANSDERMAL at 09:42

## 2017-12-17 RX ADMIN — CYCLOBENZAPRINE HYDROCHLORIDE SCH MG: 10 TABLET, FILM COATED ORAL at 14:09

## 2017-12-17 RX ADMIN — GABAPENTIN SCH MG: 100 CAPSULE ORAL at 06:31

## 2017-12-17 RX ADMIN — CYCLOBENZAPRINE HYDROCHLORIDE SCH MG: 10 TABLET, FILM COATED ORAL at 21:13

## 2017-12-17 RX ADMIN — SERTRALINE HYDROCHLORIDE SCH MG: 50 TABLET ORAL at 09:42

## 2017-12-17 RX ADMIN — Medication SCH TAB: at 09:42

## 2017-12-17 RX ADMIN — GABAPENTIN SCH MG: 100 CAPSULE ORAL at 21:13

## 2017-12-17 RX ADMIN — NAPROXEN SCH MG: 500 TABLET ORAL at 09:42

## 2017-12-17 RX ADMIN — PANTOPRAZOLE SODIUM SCH MG: 40 TABLET, DELAYED RELEASE ORAL at 09:42

## 2017-12-17 RX ADMIN — ANALGESIC BALM SCH: 1.74; 4.06 OINTMENT TOPICAL at 09:42

## 2017-12-18 RX ADMIN — NAPROXEN SCH MG: 500 TABLET ORAL at 10:31

## 2017-12-18 RX ADMIN — CYCLOBENZAPRINE HYDROCHLORIDE SCH MG: 10 TABLET, FILM COATED ORAL at 14:06

## 2017-12-18 RX ADMIN — ANALGESIC BALM SCH: 1.74; 4.06 OINTMENT TOPICAL at 10:02

## 2017-12-18 RX ADMIN — Medication SCH MG: at 21:13

## 2017-12-18 RX ADMIN — GABAPENTIN SCH MG: 100 CAPSULE ORAL at 06:24

## 2017-12-18 RX ADMIN — NICOTINE SCH: 14 PATCH, EXTENDED RELEASE TRANSDERMAL at 10:02

## 2017-12-18 RX ADMIN — PANTOPRAZOLE SODIUM SCH MG: 40 TABLET, DELAYED RELEASE ORAL at 10:02

## 2017-12-18 RX ADMIN — NAPROXEN SCH MG: 500 TABLET ORAL at 21:13

## 2017-12-18 RX ADMIN — SERTRALINE HYDROCHLORIDE SCH MG: 50 TABLET ORAL at 10:02

## 2017-12-18 RX ADMIN — CYCLOBENZAPRINE HYDROCHLORIDE SCH MG: 10 TABLET, FILM COATED ORAL at 21:13

## 2017-12-18 RX ADMIN — Medication SCH TAB: at 10:02

## 2017-12-18 RX ADMIN — GABAPENTIN SCH MG: 100 CAPSULE ORAL at 14:06

## 2017-12-18 RX ADMIN — CYCLOBENZAPRINE HYDROCHLORIDE SCH MG: 10 TABLET, FILM COATED ORAL at 06:24

## 2017-12-18 RX ADMIN — GABAPENTIN SCH MG: 100 CAPSULE ORAL at 21:13

## 2017-12-19 RX ADMIN — Medication SCH: at 21:08

## 2017-12-19 RX ADMIN — Medication SCH MG: at 21:08

## 2017-12-19 RX ADMIN — CYCLOBENZAPRINE HYDROCHLORIDE SCH MG: 10 TABLET, FILM COATED ORAL at 21:08

## 2017-12-19 RX ADMIN — NAPROXEN SCH MG: 500 TABLET ORAL at 09:37

## 2017-12-19 RX ADMIN — Medication PRN APPLIC: at 14:19

## 2017-12-19 RX ADMIN — GABAPENTIN SCH MG: 300 CAPSULE ORAL at 21:08

## 2017-12-19 RX ADMIN — ANALGESIC BALM SCH: 1.74; 4.06 OINTMENT TOPICAL at 09:37

## 2017-12-19 RX ADMIN — PANTOPRAZOLE SODIUM SCH MG: 40 TABLET, DELAYED RELEASE ORAL at 09:37

## 2017-12-19 RX ADMIN — NICOTINE SCH: 14 PATCH, EXTENDED RELEASE TRANSDERMAL at 09:37

## 2017-12-19 RX ADMIN — GABAPENTIN SCH MG: 100 CAPSULE ORAL at 06:08

## 2017-12-19 RX ADMIN — GABAPENTIN SCH MG: 300 CAPSULE ORAL at 14:18

## 2017-12-19 RX ADMIN — SERTRALINE HYDROCHLORIDE SCH MG: 50 TABLET ORAL at 09:37

## 2017-12-19 RX ADMIN — CYCLOBENZAPRINE HYDROCHLORIDE SCH MG: 10 TABLET, FILM COATED ORAL at 06:08

## 2017-12-19 RX ADMIN — CYCLOBENZAPRINE HYDROCHLORIDE SCH MG: 10 TABLET, FILM COATED ORAL at 14:18

## 2017-12-19 RX ADMIN — NAPROXEN SCH MG: 500 TABLET ORAL at 21:08

## 2017-12-19 RX ADMIN — LIDOCAINE SCH PATCH: 50 PATCH TOPICAL at 14:18

## 2017-12-19 RX ADMIN — AMITRIPTYLINE HYDROCHLORIDE SCH MG: 25 TABLET, FILM COATED ORAL at 21:08

## 2017-12-19 RX ADMIN — Medication SCH TAB: at 09:37

## 2017-12-19 NOTE — PN
BHS Progress Note (SOAP)


Subjective: 


c/o increased shoulder and back pain not responsive to current analgesic 

medications.


Objective: 





12/19/17 11:47


 Vital Signs - 24 hr











  12/19/17 12/19/17 12/19/17





  00:30 03:30 06:24


 


Temperature   97.9 F


 


Pulse Rate   83


 


Respiratory 16 16 18





Rate   


 


Blood Pressure   104/75








 Laboratory Tests











  12/12/17 12/12/17 12/13/17





  06:17 16:38 06:34


 


POC Glucometer  87  95  92








chest clear, muscel tender on palpation left shoulder and back around scapula, 

labs reviewed


Assessment: 





12/19/17 11:48


musculoskeletal chest /shoulder/back pain - will increase neurontin, start 

elavil, lidoderm patch,  patient erassured.

## 2017-12-20 RX ADMIN — NAPROXEN SCH MG: 500 TABLET ORAL at 10:10

## 2017-12-20 RX ADMIN — CYCLOBENZAPRINE HYDROCHLORIDE SCH MG: 10 TABLET, FILM COATED ORAL at 06:22

## 2017-12-20 RX ADMIN — LIDOCAINE SCH PATCH: 50 PATCH TOPICAL at 10:10

## 2017-12-20 RX ADMIN — NICOTINE SCH: 14 PATCH, EXTENDED RELEASE TRANSDERMAL at 10:10

## 2017-12-20 RX ADMIN — Medication SCH TAB: at 10:10

## 2017-12-20 RX ADMIN — PANTOPRAZOLE SODIUM SCH MG: 40 TABLET, DELAYED RELEASE ORAL at 10:10

## 2017-12-20 RX ADMIN — SERTRALINE HYDROCHLORIDE SCH MG: 50 TABLET ORAL at 10:10

## 2017-12-20 RX ADMIN — ANALGESIC BALM SCH: 1.74; 4.06 OINTMENT TOPICAL at 10:09

## 2017-12-20 RX ADMIN — Medication SCH MG: at 21:13

## 2017-12-20 RX ADMIN — NAPROXEN SCH MG: 500 TABLET ORAL at 21:13

## 2017-12-20 RX ADMIN — Medication SCH: at 21:14

## 2017-12-20 RX ADMIN — GABAPENTIN SCH MG: 300 CAPSULE ORAL at 14:08

## 2017-12-20 RX ADMIN — CYCLOBENZAPRINE HYDROCHLORIDE SCH MG: 10 TABLET, FILM COATED ORAL at 14:08

## 2017-12-20 RX ADMIN — GABAPENTIN SCH MG: 300 CAPSULE ORAL at 21:13

## 2017-12-20 RX ADMIN — CYCLOBENZAPRINE HYDROCHLORIDE SCH MG: 10 TABLET, FILM COATED ORAL at 21:13

## 2017-12-20 RX ADMIN — AMITRIPTYLINE HYDROCHLORIDE SCH MG: 25 TABLET, FILM COATED ORAL at 21:13

## 2017-12-20 RX ADMIN — GABAPENTIN SCH MG: 300 CAPSULE ORAL at 06:22

## 2017-12-21 RX ADMIN — PANTOPRAZOLE SODIUM SCH MG: 40 TABLET, DELAYED RELEASE ORAL at 09:56

## 2017-12-21 RX ADMIN — GABAPENTIN SCH MG: 300 CAPSULE ORAL at 06:36

## 2017-12-21 RX ADMIN — Medication SCH TAB: at 09:55

## 2017-12-21 RX ADMIN — NAPROXEN SCH MG: 500 TABLET ORAL at 21:38

## 2017-12-21 RX ADMIN — LIDOCAINE SCH PATCH: 50 PATCH TOPICAL at 09:55

## 2017-12-21 RX ADMIN — Medication SCH EACH: at 21:38

## 2017-12-21 RX ADMIN — GABAPENTIN SCH MG: 300 CAPSULE ORAL at 21:38

## 2017-12-21 RX ADMIN — AMITRIPTYLINE HYDROCHLORIDE SCH MG: 25 TABLET, FILM COATED ORAL at 21:38

## 2017-12-21 RX ADMIN — SERTRALINE HYDROCHLORIDE SCH MG: 50 TABLET ORAL at 09:55

## 2017-12-21 RX ADMIN — NICOTINE SCH: 14 PATCH, EXTENDED RELEASE TRANSDERMAL at 09:55

## 2017-12-21 RX ADMIN — CYCLOBENZAPRINE HYDROCHLORIDE SCH MG: 10 TABLET, FILM COATED ORAL at 13:04

## 2017-12-21 RX ADMIN — NAPROXEN SCH MG: 500 TABLET ORAL at 09:55

## 2017-12-21 RX ADMIN — GABAPENTIN SCH MG: 300 CAPSULE ORAL at 13:04

## 2017-12-21 RX ADMIN — ANALGESIC BALM SCH: 1.74; 4.06 OINTMENT TOPICAL at 09:55

## 2017-12-21 RX ADMIN — CYCLOBENZAPRINE HYDROCHLORIDE SCH MG: 10 TABLET, FILM COATED ORAL at 21:38

## 2017-12-21 RX ADMIN — CYCLOBENZAPRINE HYDROCHLORIDE SCH MG: 10 TABLET, FILM COATED ORAL at 06:36

## 2017-12-21 RX ADMIN — Medication SCH MG: at 21:38

## 2017-12-22 RX ADMIN — GABAPENTIN SCH MG: 300 CAPSULE ORAL at 21:17

## 2017-12-22 RX ADMIN — GABAPENTIN SCH MG: 300 CAPSULE ORAL at 06:09

## 2017-12-22 RX ADMIN — AMITRIPTYLINE HYDROCHLORIDE SCH MG: 25 TABLET, FILM COATED ORAL at 21:17

## 2017-12-22 RX ADMIN — PANTOPRAZOLE SODIUM SCH MG: 40 TABLET, DELAYED RELEASE ORAL at 09:52

## 2017-12-22 RX ADMIN — CYCLOBENZAPRINE HYDROCHLORIDE SCH MG: 10 TABLET, FILM COATED ORAL at 13:29

## 2017-12-22 RX ADMIN — ANALGESIC BALM SCH: 1.74; 4.06 OINTMENT TOPICAL at 09:52

## 2017-12-22 RX ADMIN — NAPROXEN SCH MG: 500 TABLET ORAL at 21:17

## 2017-12-22 RX ADMIN — NAPROXEN SCH MG: 500 TABLET ORAL at 09:52

## 2017-12-22 RX ADMIN — GABAPENTIN SCH MG: 300 CAPSULE ORAL at 13:29

## 2017-12-22 RX ADMIN — Medication SCH: at 21:17

## 2017-12-22 RX ADMIN — LIDOCAINE SCH PATCH: 50 PATCH TOPICAL at 09:52

## 2017-12-22 RX ADMIN — Medication SCH MG: at 21:17

## 2017-12-22 RX ADMIN — CYCLOBENZAPRINE HYDROCHLORIDE SCH MG: 10 TABLET, FILM COATED ORAL at 21:17

## 2017-12-22 RX ADMIN — CYCLOBENZAPRINE HYDROCHLORIDE SCH MG: 10 TABLET, FILM COATED ORAL at 06:09

## 2017-12-22 RX ADMIN — Medication SCH TAB: at 09:52

## 2017-12-22 RX ADMIN — NICOTINE SCH: 14 PATCH, EXTENDED RELEASE TRANSDERMAL at 09:52

## 2017-12-22 RX ADMIN — SERTRALINE HYDROCHLORIDE SCH MG: 50 TABLET ORAL at 09:52

## 2017-12-23 RX ADMIN — AMITRIPTYLINE HYDROCHLORIDE SCH MG: 25 TABLET, FILM COATED ORAL at 21:10

## 2017-12-23 RX ADMIN — CYCLOBENZAPRINE HYDROCHLORIDE SCH MG: 10 TABLET, FILM COATED ORAL at 14:10

## 2017-12-23 RX ADMIN — GABAPENTIN SCH MG: 300 CAPSULE ORAL at 21:10

## 2017-12-23 RX ADMIN — Medication SCH: at 21:11

## 2017-12-23 RX ADMIN — NAPROXEN SCH MG: 500 TABLET ORAL at 09:57

## 2017-12-23 RX ADMIN — LIDOCAINE SCH PATCH: 50 PATCH TOPICAL at 09:57

## 2017-12-23 RX ADMIN — PANTOPRAZOLE SODIUM SCH MG: 40 TABLET, DELAYED RELEASE ORAL at 09:58

## 2017-12-23 RX ADMIN — Medication SCH TAB: at 09:57

## 2017-12-23 RX ADMIN — CYCLOBENZAPRINE HYDROCHLORIDE SCH MG: 10 TABLET, FILM COATED ORAL at 06:20

## 2017-12-23 RX ADMIN — NICOTINE SCH: 14 PATCH, EXTENDED RELEASE TRANSDERMAL at 09:58

## 2017-12-23 RX ADMIN — GABAPENTIN SCH MG: 300 CAPSULE ORAL at 14:10

## 2017-12-23 RX ADMIN — Medication PRN APPLIC: at 09:56

## 2017-12-23 RX ADMIN — Medication SCH MG: at 21:10

## 2017-12-23 RX ADMIN — NAPROXEN SCH MG: 500 TABLET ORAL at 21:10

## 2017-12-23 RX ADMIN — CYCLOBENZAPRINE HYDROCHLORIDE SCH MG: 10 TABLET, FILM COATED ORAL at 21:10

## 2017-12-23 RX ADMIN — SERTRALINE HYDROCHLORIDE SCH MG: 50 TABLET ORAL at 09:57

## 2017-12-23 RX ADMIN — GABAPENTIN SCH MG: 300 CAPSULE ORAL at 06:20

## 2017-12-23 RX ADMIN — ANALGESIC BALM SCH: 1.74; 4.06 OINTMENT TOPICAL at 09:58

## 2017-12-24 RX ADMIN — PANTOPRAZOLE SODIUM SCH MG: 40 TABLET, DELAYED RELEASE ORAL at 09:36

## 2017-12-24 RX ADMIN — Medication SCH: at 21:13

## 2017-12-24 RX ADMIN — Medication SCH TAB: at 09:37

## 2017-12-24 RX ADMIN — NICOTINE SCH: 14 PATCH, EXTENDED RELEASE TRANSDERMAL at 09:37

## 2017-12-24 RX ADMIN — CYCLOBENZAPRINE HYDROCHLORIDE SCH MG: 10 TABLET, FILM COATED ORAL at 21:13

## 2017-12-24 RX ADMIN — GABAPENTIN SCH MG: 300 CAPSULE ORAL at 21:13

## 2017-12-24 RX ADMIN — ANALGESIC BALM SCH: 1.74; 4.06 OINTMENT TOPICAL at 09:37

## 2017-12-24 RX ADMIN — SERTRALINE HYDROCHLORIDE SCH MG: 50 TABLET ORAL at 09:37

## 2017-12-24 RX ADMIN — CYCLOBENZAPRINE HYDROCHLORIDE SCH MG: 10 TABLET, FILM COATED ORAL at 06:33

## 2017-12-24 RX ADMIN — Medication SCH MG: at 21:13

## 2017-12-24 RX ADMIN — CYCLOBENZAPRINE HYDROCHLORIDE SCH MG: 10 TABLET, FILM COATED ORAL at 14:09

## 2017-12-24 RX ADMIN — AMITRIPTYLINE HYDROCHLORIDE SCH MG: 25 TABLET, FILM COATED ORAL at 21:13

## 2017-12-24 RX ADMIN — GABAPENTIN SCH MG: 300 CAPSULE ORAL at 14:09

## 2017-12-24 RX ADMIN — NAPROXEN SCH MG: 500 TABLET ORAL at 09:37

## 2017-12-24 RX ADMIN — NAPROXEN SCH MG: 500 TABLET ORAL at 21:13

## 2017-12-24 RX ADMIN — GABAPENTIN SCH MG: 300 CAPSULE ORAL at 06:33

## 2017-12-24 RX ADMIN — LIDOCAINE SCH PATCH: 50 PATCH TOPICAL at 09:37

## 2017-12-25 RX ADMIN — GABAPENTIN SCH MG: 300 CAPSULE ORAL at 07:03

## 2017-12-25 RX ADMIN — LIDOCAINE SCH: 50 PATCH TOPICAL at 10:10

## 2017-12-25 RX ADMIN — NAPROXEN SCH MG: 500 TABLET ORAL at 10:10

## 2017-12-25 RX ADMIN — NICOTINE SCH: 14 PATCH, EXTENDED RELEASE TRANSDERMAL at 10:10

## 2017-12-25 RX ADMIN — ANALGESIC BALM SCH: 1.74; 4.06 OINTMENT TOPICAL at 10:10

## 2017-12-25 RX ADMIN — GABAPENTIN SCH MG: 300 CAPSULE ORAL at 21:17

## 2017-12-25 RX ADMIN — CYCLOBENZAPRINE HYDROCHLORIDE SCH MG: 10 TABLET, FILM COATED ORAL at 21:17

## 2017-12-25 RX ADMIN — PANTOPRAZOLE SODIUM SCH MG: 40 TABLET, DELAYED RELEASE ORAL at 10:10

## 2017-12-25 RX ADMIN — Medication SCH TAB: at 10:10

## 2017-12-25 RX ADMIN — SERTRALINE HYDROCHLORIDE SCH MG: 50 TABLET ORAL at 10:09

## 2017-12-25 RX ADMIN — CYCLOBENZAPRINE HYDROCHLORIDE SCH MG: 10 TABLET, FILM COATED ORAL at 14:16

## 2017-12-25 RX ADMIN — Medication SCH MG: at 21:17

## 2017-12-25 RX ADMIN — Medication SCH EACH: at 21:18

## 2017-12-25 RX ADMIN — CYCLOBENZAPRINE HYDROCHLORIDE SCH MG: 10 TABLET, FILM COATED ORAL at 07:03

## 2017-12-25 RX ADMIN — GABAPENTIN SCH MG: 300 CAPSULE ORAL at 14:16

## 2017-12-25 RX ADMIN — NAPROXEN SCH MG: 500 TABLET ORAL at 21:17

## 2017-12-25 RX ADMIN — AMITRIPTYLINE HYDROCHLORIDE SCH MG: 25 TABLET, FILM COATED ORAL at 21:17

## 2017-12-26 VITALS — TEMPERATURE: 97.2 F | SYSTOLIC BLOOD PRESSURE: 112 MMHG | DIASTOLIC BLOOD PRESSURE: 77 MMHG | HEART RATE: 79 BPM

## 2017-12-26 RX ADMIN — GABAPENTIN SCH MG: 300 CAPSULE ORAL at 06:28

## 2017-12-26 RX ADMIN — NICOTINE SCH: 14 PATCH, EXTENDED RELEASE TRANSDERMAL at 10:01

## 2017-12-26 RX ADMIN — SERTRALINE HYDROCHLORIDE SCH MG: 50 TABLET ORAL at 10:00

## 2017-12-26 RX ADMIN — ANALGESIC BALM SCH: 1.74; 4.06 OINTMENT TOPICAL at 10:00

## 2017-12-26 RX ADMIN — NAPROXEN SCH MG: 500 TABLET ORAL at 10:00

## 2017-12-26 RX ADMIN — CYCLOBENZAPRINE HYDROCHLORIDE SCH MG: 10 TABLET, FILM COATED ORAL at 06:28

## 2017-12-26 RX ADMIN — Medication SCH TAB: at 10:00

## 2017-12-26 RX ADMIN — PANTOPRAZOLE SODIUM SCH MG: 40 TABLET, DELAYED RELEASE ORAL at 10:00

## 2017-12-26 RX ADMIN — LIDOCAINE SCH: 50 PATCH TOPICAL at 10:01

## 2017-12-26 NOTE — PN
Psychiatric Progress Note


Vital Signs: 


 Vital Signs











 Period  Temp  Pulse  Resp  BP Sys/Zelaya  Pulse Ox


 


 Last 24 Hr  97.2 F  79  16-18  112/77  











Date of Session: 12/26/17


Chief Complaint:: discharge visit


HPI: Patient has addressed alcohol, nicotine dependence comorbid Bipolar I 

disorder.


ROS: WNL


Current Medications: 


Active Medications











Generic Name Dose Route Start Last Admin





  Trade Name Freq  PRN Reason Stop Dose Admin


 


Acetaminophen  650 mg  12/11/17 16:34  





  Tylenol -  PO   





  Q4H PRN   





  FEVER OR PAIN   


 


Al Hydroxide/Mg Hydroxide  30 ml  12/11/17 16:34  





  Mylanta Oral Suspension -  PO   





  Q6H PRN   





  DYSPEPSIA   


 


Amitriptyline HCl  25 mg  12/19/17 22:00  12/25/17 21:17





  Elavil -  PO   25 mg





  HS NICKO   Administration


 


Cyclobenzaprine HCl  10 mg  12/12/17 15:27  12/26/17 06:28





  Flexeril -  PO   10 mg





  TID NICKO   Administration


 


Eucalyptus/Menthol/Phenol/Sorbitol  1 each  12/11/17 16:34  





  Cepastat Lozenge -  MM   





  Q4H PRN   





  SORE THROAT   


 


Gabapentin  300 mg  12/19/17 14:00  12/26/17 06:28





  Neurontin -  PO   300 mg





  TID NICKO   Administration


 


Guaifenesin  10 ml  12/11/17 16:34  





  Robitussin Dm -  PO   





  Q6H PRN   





  COUGH   


 


Lactic Acid  1 applic  12/19/17 11:49  12/23/17 09:56





  Lac-Hydrin 12  TP   1 applic





  DAILY PRN   Administration





  DRY SKIN   


 


Lidocaine  1 patch  12/19/17 13:00  12/26/17 10:01





  Lidoderm Patch -  TP   Not Given





  DAILY NICKO   


 


Loperamide HCl  4 mg  12/11/17 16:34  





  Imodium -  PO   





  Q6H PRN   





  DIARRHEA   


 


Magnesium Citrate  300 ml  12/11/17 16:34  





  Citroma -  PO   





  Q48H PRN   





  CONSTIPATION   


 


Magnesium Hydroxide  30 ml  12/11/17 16:34  





  Milk Of Magnesia -  PO   





  DAILY PRN   





  CONSTIPATION   


 


Methyl Salicylate  1 applic  12/12/17 14:45  12/26/17 10:00





  Abhishek-Alvarez -  TP   Not Given





  DAILY NICKO   


 


Miscellaneous  1 each  12/19/17 22:00  12/25/17 21:18





  Lidoderm Patch Removal  MC   1 each





  DAILY@2200 NICKO   Administration


 


Naproxen  500 mg  12/12/17 22:00  12/26/17 10:00





  Naprosyn -  PO   500 mg





  BID NICKO   Administration


 


Nicotine  14 mg  12/11/17 18:00  12/26/17 10:01





  Nicoderm Patch -  TD   Not Given





  DAILY NICKO   


 


Nicotine Polacrilex  2 mg  12/11/17 16:34  





  Nicorette Gum -  BUC   





  Q2H PRN   





  NICOTINE REPLACEMENT RX   


 


Pantoprazole Sodium  40 mg  12/12/17 15:27  12/26/17 10:00





  Protonix -  PO   40 mg





  DAILY NICKO   Administration


 


Prenatal Multivit/Folic Acid/Iron  1 tab  12/12/17 10:00  12/26/17 10:00





  Prenatal Vitamins (Sjr) -  PO   1 tab





  DAILY NICKO   Administration


 


Pseudoephedrine/Triprolidine  1 combo  12/11/17 16:34  





  Actifed -  PO   





  TID PRN   





  NASAL CONGESTION   


 


Quetiapine Fumarate  200 mg  12/11/17 22:00  12/25/17 21:17





  Seroquel -  PO   200 mg





  HS NICKO   Administration


 


Sertraline HCl  100 mg  12/12/17 10:00  12/26/17 10:00





  Zoloft -  PO   100 mg





  DAILY NICKO   Administration


 


Thiamine HCl  100 mg  12/11/17 22:00  12/25/17 21:17





  Vitamin B1 -  PO   100 mg





  HS NICKO   Administration











Current Side Effect: No


Lab tests ordered: No


Lab tests reviewed: Yes


Provider note:: Patient has completed today this program and met his goals, 

will contineu to address his issues at [facility] outpatient 

treatment program. He understands the negative consequences of his addiction 

and verbalized resolution to stay abstinent and aherent to every aspects of his 

aftercare plans, medications Seroquel and Zoloft well tolerated, no side-

effects reported, scripts provided for 30 days, patient is stable for discharge 

today.


Total face to face time:: 25





Mental Status Exam





- Mental Status Exam


Alert and Oriented to: Time, Place, Person


Cognitive Function: Good


Patient Appearance: Well Groomed


Mood: Hopeful


Affect: Appropriate, Mood Congruent


Patient Behavior: Appropriate, Cooperative


Speech Pattern: Clear, Appropriate


Voice Loudness: Normal


Thought Process: Intact, Goal Oriented


Thought Disorder: Not Present


Hallucinations: Denies


Suicidal Ideation: Denies


Homicidal Ideation: Denies


Insight/Judgement: Fair


Sleep: Fair


Appetite: Fair


Muscle strength/Tone: Normal


Gait/Station: Normal





Psychiatric Treatment Plan





- Problem List


(1) Alcohol dependence


Current Visit: Yes   





(2) Bipolar 1 disorder


Current Visit: No   





(3) Nicotine dependence


Current Visit: No   


Qualifiers: 


   Nicotine product type: cigarettes   Substance use status: uncomplicated   

Qualified Code(s): F17.210 - Nicotine dependence, cigarettes, uncomplicated

## 2018-10-19 ENCOUNTER — HOSPITAL ENCOUNTER (EMERGENCY)
Dept: HOSPITAL 74 - JER | Age: 48
Discharge: LEFT BEFORE DISCHARGE | End: 2018-10-19
Payer: COMMERCIAL

## 2018-10-19 VITALS — HEART RATE: 124 BPM | SYSTOLIC BLOOD PRESSURE: 135 MMHG | TEMPERATURE: 98.8 F | DIASTOLIC BLOOD PRESSURE: 86 MMHG

## 2018-10-19 VITALS — BODY MASS INDEX: 30.7 KG/M2

## 2018-10-19 DIAGNOSIS — R26.81: ICD-10-CM

## 2018-10-19 DIAGNOSIS — E78.00: ICD-10-CM

## 2018-10-19 DIAGNOSIS — Z86.73: ICD-10-CM

## 2018-10-19 DIAGNOSIS — F31.9: ICD-10-CM

## 2018-10-19 DIAGNOSIS — F10.120: Primary | ICD-10-CM

## 2018-10-19 LAB
ALBUMIN SERPL-MCNC: 4.1 G/DL (ref 3.4–5)
ALP SERPL-CCNC: 86 U/L (ref 45–117)
ALT SERPL-CCNC: 42 U/L (ref 13–61)
AMPHET UR-MCNC: NEGATIVE NG/ML
ANION GAP SERPL CALC-SCNC: 9 MMOL/L (ref 8–16)
AST SERPL-CCNC: 66 U/L (ref 15–37)
BARBITURATES UR-MCNC: NEGATIVE NG/ML
BASOPHILS # BLD: 0.8 % (ref 0–2)
BENZODIAZ UR SCN-MCNC: NEGATIVE NG/ML
BILIRUB SERPL-MCNC: 0.7 MG/DL (ref 0.2–1)
BUN SERPL-MCNC: 6 MG/DL (ref 7–18)
CALCIUM SERPL-MCNC: 8.8 MG/DL (ref 8.5–10.1)
CHLORIDE SERPL-SCNC: 107 MMOL/L (ref 98–107)
CO2 SERPL-SCNC: 26 MMOL/L (ref 21–32)
COCAINE UR-MCNC: NEGATIVE NG/ML
CREAT SERPL-MCNC: 0.6 MG/DL (ref 0.55–1.3)
DEPRECATED RDW RBC AUTO: 13 % (ref 11.9–15.9)
EOSINOPHIL # BLD: 0.2 % (ref 0–4.5)
GLUCOSE SERPL-MCNC: 102 MG/DL (ref 74–106)
HCT VFR BLD CALC: 41 % (ref 35.4–49)
HGB BLD-MCNC: 14.1 GM/DL (ref 11.7–16.9)
LYMPHOCYTES # BLD: 33.6 % (ref 8–40)
MCH RBC QN AUTO: 32.7 PG (ref 25.7–33.7)
MCHC RBC AUTO-ENTMCNC: 34.4 G/DL (ref 32–35.9)
MCV RBC: 95.1 FL (ref 80–96)
METHADONE UR-MCNC: NEGATIVE NG/ML
MONOCYTES # BLD AUTO: 6.9 % (ref 3.8–10.2)
NEUTROPHILS # BLD: 58.5 % (ref 42.8–82.8)
OPIATES UR QL SCN: NEGATIVE NG/ML
PCP UR QL SCN: NEGATIVE NG/ML
PLATELET # BLD AUTO: 237 K/MM3 (ref 134–434)
PMV BLD: 7.6 FL (ref 7.5–11.1)
POTASSIUM SERPLBLD-SCNC: 3.6 MMOL/L (ref 3.5–5.1)
PROT SERPL-MCNC: 7.4 G/DL (ref 6.4–8.2)
RBC # BLD AUTO: 4.31 M/MM3 (ref 4–5.6)
SODIUM SERPL-SCNC: 142 MMOL/L (ref 136–145)
WBC # BLD AUTO: 7 K/MM3 (ref 4–10)

## 2018-10-19 NOTE — PDOC
History of Present Illness





- General


Chief Complaint: Alcohol intoxication


Stated Complaint: Alcohol intoxication


Time Seen by Provider: 10/19/18 15:03


History Source: Patient


Exam Limitations: No Limitations





- History of Present Illness


Initial Comments: 





10/19/18 17:19


48-year-old male presents to ED for medical evaluation prior to going to [facility] for alcohol detox. Patient states went there to be admitted and was told 

secondary to unsteady gait and level of intoxication patient should he sent to 

the ER first. Patient states last alcoholic drink was this morning. Patient 

states drinks approximately vodka daily for numerous years. Patient is 

requesting rehabilitation


Timing/Duration: unsure


Severity: moderate


Associated Symptoms: reports: denies symptoms





Past History





- Travel


Traveled outside of the country in the last 30 days: No





- Past Medical History


Allergies/Adverse Reactions: 


 Allergies











Allergy/AdvReac Type Severity Reaction Status Date / Time


 


No Known Allergies Allergy   Verified 10/19/18 11:09











Home Medications: 


Ambulatory Orders





Quetiapine Fumarate [Seroquel -] 200 mg PO HS 02/21/18 


Sertraline HCl [Zoloft -] 100 mg PO DAILY 02/21/18 








Anemia: No


Asthma: No


Cancer: No


Cardiac Disorders: No


CVA: Yes (2016)


COPD: No


CHF: No


Dementia: No


Diabetes: No


GI Disorders: No


 Disorders: No


HTN: No


Hypercholesterolemia: Yes (NO MEDS)


Kidney Stones: No


Liver Disease: No


Psychiatric Problems: Yes (bipolar)


Seizures: No


Thyroid Disease: No





- Surgical History


Abdominal Surgery: No


Appendectomy: No


Cardiac Surgery: No


Cholecystectomy: No


Lung Surgery: No


Neurologic Surgery: No


Orthopedic Surgery: No





- Reproductive History


Testicular Surgery: No





- Immunization History


Immunization Up to Date: No





- Suicide/Smoking/Psychosocial Hx


Smoking History: Current every day smoker


Have you smoked in the past 12 months: No


Number of Cigarettes Smoked Daily: 4


Cigars Per Day: 0


Information on smoking cessation initiated: No


'Breaking Loose' booklet given: 02/21/18


Hx Alcohol Use: Yes


Drug/Substance Use Hx: No


Substance Use Type: Alcohol


Hx Substance Use Treatment: Yes


Patient Lives Alone: No


Lives with/in: spouse/SO





**Review of Systems





- Review of Systems


Able to Perform ROS?: No


Constitutional: No: Symptoms Reported


Respiratory: No: Symptoms reported


Cardiac (ROS): No: Symptoms Reported


ABD/GI: No: Symptoms Reported


: No: Symptoms Reported


Musculoskeletal: No: Symptoms Reported


Integumentary: No: Symptoms Reported


Neurological: No: Symptoms reported


Endocrine: No: Symptoms Reported


Hematologic/Lymphatic: No: Symptoms Reported





*Physical Exam





- Vital Signs


 Last Vital Signs











Temp Pulse Resp BP Pulse Ox


 


 98.8 F   124 H  18   135/86   100 


 


 10/19/18 14:54  10/19/18 14:54  10/19/18 14:54  10/19/18 14:54  10/19/18 14:54














- Physical Exam


General Appearance: Yes: Nourished, Appropriately Dressed, Intoxicated.  No: 

Apparent Distress


HEENT: positive: Pharynx Normal (dry).  negative: Pale Conjunctivae


Respiratory/Chest: positive: Lungs Clear, Normal Breath Sounds.  negative: 

Respiratory Distress, Accessory Muscle Use


Cardiovascular: positive: Regular Rhythm, Tachycardia.  negative: Murmur


Gastrointestinal/Abdominal: positive: Soft.  negative: Tenderness


Integumentary: positive: Normal Color, Dry, Warm


Neurologic: positive: Motor Strength 5/5 (ambulatory with unsteady gait).  

negative: Normal Mood/Affect (alert but intoxicated)





ED Treatment Course





- LABORATORY


CBC & Chemistry Diagram: 


 10/19/18 15:35





 10/19/18 15:35





- ADDITIONAL ORDERS


Additional order review: 


 Laboratory  Results











  10/19/18





  15:08


 


Opiates Screen  Negative


 


Methadone Screen  Negative


 


Barbiturate Screen  Negative


 


Phencyclidine Screen  Negative


 


Ur Amphetamines Screen  Negative


 


MDMA (Ecstasy) Screen  Negative


 


Benzodiazepines Screen  Negative


 


Cocaine Screen  Negative


 


U Marijuana (THC) Screen  Negative














Medical Decision Making





- Medical Decision Making





10/19/18 17:38


CC: Patient sent here from [facility] for medical clearance/evaluation. Patient 

requesting alcohol detox.


Exam: tachy, Patient with unsteady Gait.


Plan:  Labs, ua, and etoh level ordered


10/19/18 17:40


 Laboratory Tests











  10/19/18 10/19/18 10/19/18





  15:08 15:35 15:35


 


WBC   7.0 


 


Hgb   14.1 


 


Hct   41.0 


 


Absolute Neuts (auto)   4.1 


 


Sodium    142


 


Potassium    3.6


 


Chloride    107


 


Carbon Dioxide    26


 


Anion Gap    9


 


Creatinine    0.6


 


Random Glucose    102


 


Calcium    8.8


 


Total Bilirubin    0.7


 


AST    66 H


 


ALT    42


 


Alkaline Phosphatase    86


 


Total Protein    7.4


 


Albumin    4.1


 


Opiates Screen  Negative  


 


Methadone Screen  Negative  


 


Barbiturate Screen  Negative  


 


Phencyclidine Screen  Negative  


 


Ur Amphetamines Screen  Negative  


 


MDMA (Ecstasy) Screen  Negative  


 


Benzodiazepines Screen  Negative  


 


Cocaine Screen  Negative  


 


U Marijuana (THC) Screen  Negative  











10/19/18 17:41


Pt will be given food tray


10/19/18 18:03


Went to bring patient a dinner try and unable to locate him in his assigned area

, ER hallway or on the ER ramp. Patient will be considered an elopement.





*DC/Admit/Observation/Transfer


Diagnosis at time of Disposition: 


Alcohol intoxication


Qualifiers:


 Complication of substance-induced condition: uncomplicated Qualified Code(s): 

F10.920 - Alcohol use, unspecified with intoxication, uncomplicated








- Discharge Dispostion


Disposition: ELOPED


Condition at time of disposition: Unchanged/Unknown





- Referrals





- Patient Instructions





- Post Discharge Activity

## 2018-11-01 ENCOUNTER — HOSPITAL ENCOUNTER (INPATIENT)
Dept: HOSPITAL 74 - YASAS | Age: 48
Discharge: LEFT BEFORE DISCHARGE | DRG: 770 | End: 2018-11-01
Attending: INTERNAL MEDICINE | Admitting: INTERNAL MEDICINE
Payer: COMMERCIAL

## 2018-11-01 VITALS — SYSTOLIC BLOOD PRESSURE: 130 MMHG | TEMPERATURE: 97.2 F | DIASTOLIC BLOOD PRESSURE: 76 MMHG

## 2018-11-01 VITALS — HEART RATE: 120 BPM

## 2018-11-01 VITALS — BODY MASS INDEX: 28.9 KG/M2

## 2018-11-01 DIAGNOSIS — F31.9: ICD-10-CM

## 2018-11-01 DIAGNOSIS — F10.230: Primary | ICD-10-CM

## 2018-11-01 DIAGNOSIS — F10.220: ICD-10-CM

## 2018-11-01 DIAGNOSIS — F19.24: ICD-10-CM

## 2018-11-01 DIAGNOSIS — Z86.73: ICD-10-CM

## 2018-11-01 DIAGNOSIS — Z87.438: ICD-10-CM

## 2018-11-01 DIAGNOSIS — F17.210: ICD-10-CM

## 2018-11-01 PROCEDURE — HZ2ZZZZ DETOXIFICATION SERVICES FOR SUBSTANCE ABUSE TREATMENT: ICD-10-PCS | Performed by: SURGERY

## 2018-11-01 NOTE — CONSULT
[facility] Psychiatric Consult





- Data


Date of interview: 11/01/18


Admission source: [facility]


Identifying data: Patient is a 48 year old single male, father of seven. 

unemployed, homeless and is supported by [facility]. This is one of multiple 

admissions for patient. Patient admitted to  for alcohol dependence.


Substance Abuse History: Smoking Cessation.  Smoking history: Current every day 

smoker.  Have you smoked in the past 12 months: No.  Aproximately how many 

cigarettes per day: 4.  Cigars Per Day: 0.  Hx Chewing Tobacco Use: No.  

Initiated information on smoking cessation: Yes.  'Breaking Loose' booklet given

: 11/01/18.  - Substance & Tx. History.  Hx Alcohol Use: Yes.  Hx Substance Use

: No.  Substance Use Type: Alcohol.  Hx Substance Use Treatment: Yes


Medical History: hypercholesterolemia, CVA (2016), Syphillis


Psychiatric History: Patient reports multiple psychiatric hospitalizations, 

most recently three years ago at [facility]. Patient is also known to 

[facility], and [facility]. Outpatient psychiatric care is 

provided in the [place], [place]. He last saw his outpatient psychiatrist three months 

ago. Patient is currently prescribed zoloft 100mg + Seroquel 100mg + Abilify 

5mg + Amitriptyline 50mg PRN. Most recent prescription for all four medications 

were electronically sent on 10/12/18.  Patient denies h/o suicide attempt. 

Patient is currently restless and is requesting to resume his medications.


Physical/Sexual Abuse/Trauma History: denies.





Mental Status Exam





- Mental Status Exam


Alert and Oriented to: Time, Place, Person


Cognitive Function: Good


Patient Appearance: Well Groomed


Mood: Anxious, Euthymic


Affect: Mood Congruent


Patient Behavior: Cooperative


Speech Pattern: Appropriate


Voice Loudness: Moderately Soft/Quiet


Thought Process: Intact, Goal Oriented


Thought Disorder: Not Present


Hallucinations: Denies


Suicidal Ideation: Denies


Homicidal Ideation: Denies


Insight/Judgement: Poor


Sleep: Poorly


Appetite: Fair


Muscle strength/Tone: Normal


Gait/Station: Normal





Psychiatric Findings





- Problem List (Axis 1, 2,3)


(1) Alcohol dependence with withdrawal, uncomplicated


Status: Acute   





(2) Bipolar disorder


Status: Chronic   





(3) Substance induced mood disorder


Status: Acute   





(4) Nicotine dependence


Status: Chronic   


Qualifiers: 


   Nicotine product type: cigarettes   Substance use status: uncomplicated   

Qualified Code(s): F17.210 - Nicotine dependence, cigarettes, uncomplicated   





- Initial Treatment Plan


Initial Treatment Plan: Psychoeducation provided. Detoxification in progress. 

Will order Zoloft 100mg + Seroquel 100mg + Abilify 5mg. Benefits and side 

effects discussed. Verbal consent given.

## 2018-11-01 NOTE — DS
[facility] Detox Discharge Summary


Admission Date: 


11/01/18








- History


Present History: Alcohol Dependence





- Physical Exam Results


Vital Signs: 


 Vital Signs











Temperature  97.2 F L  11/01/18 13:10


 


Pulse Rate  120 H  11/01/18 13:30


 


Respiratory Rate  20   11/01/18 13:30


 


Blood Pressure  130/76   11/01/18 13:10


 


O2 Sat by Pulse Oximetry (%)      














- Medication


Discharge Medications: 


Ambulatory Orders





Quetiapine Fumarate [Seroquel -] 200 mg PO HS 02/21/18 


Sertraline HCl [Zoloft -] 100 mg PO DAILY 02/21/18 











- Diagnosis


(1) Alcohol dependence with uncomplicated intoxication


Current Visit: Yes   Status: Acute   





- AMA


Did Patient Leave Against Medical Advice: Yes

## 2018-11-01 NOTE — EKG
Test Reason : 

Blood Pressure : ***/*** mmHG

Vent. Rate : 103 BPM     Atrial Rate : 103 BPM

   P-R Int : 162 ms          QRS Dur : 084 ms

    QT Int : 344 ms       P-R-T Axes : 049 050 034 degrees

   QTc Int : 450 ms

 

SINUS TACHYCARDIA

OTHERWISE NORMAL ECG

WHEN COMPARED WITH ECG OF 21-FEB-2018 21:13,

NO SIGNIFICANT CHANGE WAS FOUND

Confirmed by TENA DUARTE MD ([id number]) on 11/1/2018 3:09:34 PM

 

Referred By:             Confirmed By:TENA DUARTE MD

## 2018-11-01 NOTE — HP
CIWA Score





- CIWA Score


Nausea/Vomitin-Mild Nausea/No Vomiting


Muscle Tremors: 2


Anxiety: 2


Agitation: 2


Paroxysmal Sweats: 1-Minimal Palms Moist


Orientation: 0-Oriented


Tacttile Disturbances: 1-Very Mild Itch/Numbness


Auditory Disturbances: 1-Very Mild


Visual Disturbances: 1-Very Mild Sensitivity


Headache: 2-Mild


CIWA-Ar Total Score: 13





Admission ROS [facility]





- HPI


Chief Complaint: 





i need help to stop drinking alcohol,seen in [place] last night


Allergies/Adverse Reactions: 


 Allergies











Allergy/AdvReac Type Severity Reaction Status Date / Time


 


No Known Allergies Allergy   Verified 18 09:25











History of Present Illness: 





this 48 years old male with alcohol dependence,seeking help,withdrawal symptom,

last detox 18 to 18


seizure alcohol related,last 5 years ago


arthritis


injury to right shoulder 1 week ago


old deformity of left wist 15 years ago


bipolar disorder 


positive ppd


no significant period of sobreity


seen in [place] last night


pain in the right shoulder


no pain in the wrist or elbow











- Ebola screening


Have you traveled outside of the country in the last 21 days: No


Have you had contact with anyone from an Ebola affected area: No


Have you been sick,other than usual withdrawal symptoms: No


Do you have a fever: No





- Review of Systems


Constitutional: Loss of Appetite, Malaise, Night Sweats, Changes in sleep


EENT: reports: Nose Congestion


Respiratory: reports: No Symptoms reported


Cardiac: reports: Palpitations


GI: reports: Nausea, Vomiting, Abdominal cramping


: reports: No Symptoms Reported


Musculoskeletal: reports: Back Pain, Muscle Pain, Joint Stiffness


Integumentary: reports: Dryness


Neuro: reports: Headache, Tremors


Endocrine: reports: No Symptoms Reported


Hematology: reports: No Symptoms Reported


Psychiatric: reports: No Sypmtoms Reported, Judgement Intact, Mood/Affect 

Appropiate, Orientated x3





Patient History





- Patient Medical History


Hx Anemia: No


Hx Asthma: No


Hx Chronic Obstructive Pulmonary Disease (COPD): No


Hx Cancer: No


Hx Cardiac Disorders: No


Hx Congestive Heart Failure: No


Hx Hypertension: No


Hx Hypercholesterolemia: Yes (NO MEDS)


Hx Pacemaker: No


HX Cerebrovascular Accident: Yes (2016)


Hx Seizures: No


Hx Dementia: No


Hx Diabetes: No


Hx Gastrointestinal Disorders: No


Hx Liver Disease: No


Hx Genitourinary Disorders: No


Hx Sexually Transmitted Disorders: Yes (syphilis at age 15)


Hx Renal Disease (ESRD): No


Hx Thyroid Disease: No


Hx Human Immunodeficiency Virus (HIV): No (NEGATIVE HX last negative )


Hx Hepatitis C: No


Hx Depression: Yes


Hx Suicide Attempt: No


Hx Bipolar Disorder: Yes (on Seroquel and Zoloft as per Hx.)


Hx Schizophrenia: No


Other Medical History: no suicidal,no homicidal





- Patient Surgical History


Past Surgical History: No


Hx Neurologic Surgery: No


Hx Cataract Extraction: No


Hx Cardiac Surgery: No


Hx Lung Surgery: No


Hx Breast Surgery: No


Hx Breast Biopsy: No


Hx Abdominal Surgery: No


Hx Appendectomy: No


Hx Cholecystectomy: No


Hx Genitourinary Surgery: No


Hx  Section: No


Hx Orthopedic Surgery: No


Anesthesia Reaction: No





- PPD History


Previous Implant?: Yes


Documented Results: Positive w/o proof


Implanted On Prior [facility] Admission?: No


Results: CXR neg 2017


PPD to be Administered?: No





- Smoking Cessation


Smoking history: Current every day smoker


Have you smoked in the past 12 months: No


Aproximately how many cigarettes per day: 4


Cigars Per Day: 0


Hx Chewing Tobacco Use: No


Initiated information on smoking cessation: Yes


'Breaking Loose' booklet given: 18





- Substance & Tx. History


Hx Alcohol Use: Yes


Hx Substance Use: No


Substance Use Type: Alcohol


Hx Substance Use Treatment: Yes





Family Disease History





- Family Disease History


Family Disease History: Diabetes: Mother (HTN), Other: Father (alcohol,)

, Mother





Admission Physical Exam [facility]





- Vital Signs


Vital Signs: 


 Vital Signs - 24 hr











  18





  09:14


 


Temperature 96.2 F L


 


Pulse Rate 107 H


 


Respiratory 20





Rate 


 


Blood Pressure 147/75














- Physical


General Appearance: Yes: Mild Distress, Tremorous, Irritable, Anxious


HEENTM: Yes: Normal ENT Inspection, [place], Pharynx Normal


Respiratory: Yes: Within Normal Limits, Lungs Clear, No Respiratory Distress


Neck: Yes: Within Normal Limits, Supple, Trachea in good position


Breast: Yes: Within Normal Limits


Cardiology: Yes: Tachycardia


Abdominal: Yes: Within Normal Limits, Normal Bowel Sounds, Non Tender, Flat, 

Soft


Genitourinary: Yes: Within Normal Limits


Back: Yes: Muscle Spasm


Musculoskeletal: Yes: Back pain, Muscle Pain


Extremities: Yes: Tremors, Other (old deformity left wrist fx for 15 years pain 

pain in right shoulder)


Neurological: Yes: CNs II-XII NML intact, Alert, Motor Strength 5/5


Integumentary: Yes: Dry


Lymphatic: Yes: Within Normal Limits





- Diagnostic


(1) Alcohol dependence with uncomplicated intoxication


Current Visit: Yes   Status: Acute   





(2) Alcohol dependence with withdrawal, uncomplicated


Current Visit: No   Status: Acute   





(3) Old cerebrovascular accident (CVA) without late effect


Current Visit: Yes   Status: Acute   





(4) Left wrist fracture


Current Visit: Yes   Status: Acute   





(5) Sprain of right shoulder


Current Visit: Yes   Status: Acute   





(6) History of syphilis


Current Visit: Yes   Status: Acute   





(7) Bipolar disorder


Current Visit: Yes   Status: Acute   





(8) Nicotine dependence


Current Visit: No   Status: Chronic   


Qualifiers: 


   Nicotine product type: cigarettes   Substance use status: uncomplicated   

Qualified Code(s): F17.210 - Nicotine dependence, cigarettes, uncomplicated   





Cleared for Admission S





- Detox or Rehab


[facility] Level of Care: Medically Managed


Detox Regimen/Protocol: Librium





[facility] Breath Alcohol Content


Breath Alcohol Content: 0.323





Urine Drug Screen





- Results


Drug Screen Negative: No


Urine Drug Screen Results: BZO-Benzodiazepines

## 2018-11-01 NOTE — PN
[facility] Progress Note


Note: 


NOTIFIED BY RN THAT PATIENT REQUESTED TO SIGN OUT AMA. PATIENT EVALUATED ON UNIT

, FULLY DRESSED, PACING ON UNIT, AGITATED. PATIENT STATED TO WRITER " I WANT TO 

LEAVE! THE ALCOHOL IS CALLING ME AND THE MEDICATION DOES NOT HELP".PATIENT WAS 

GIVEN LIBRIUM 50MG AT 11:25AM. PATIENT COHERENT AND AWARE OF HIS SURROUNDINGS. 

PATIENT DENIES SI/HI, CHEST PAIN, SOB AND DIZZINESS. PATIENT STRONGLY ADVISED 

TO STAY IN DETOX BUT ADAMANTLY REFUSED. EXPLAINED RISK FACTORS OF RELAPSE/ 

DEATH TO PATIENT WITH NO POSITIVE EFFECT. PATIENT INSISTED HE HAD TO LEAVE 

RIGHT NOW. PATIENT EDUCATED TO SEEK MEDICAL ATTENTION/ GO TO ER/ CALL 911 IF 

WITHDRAWAL SYMPTOMS OCCUR. PATIENT SIGNED OUT AMA DESPITE EFFORTS. 





 Vital Signs











Temperature  97.2 F L  11/01/18 13:10


 


Pulse Rate  120 H  11/01/18 13:30


 


Respiratory Rate  20   11/01/18 13:30


 


Blood Pressure  130/76   11/01/18 13:10


 


O2 Sat by Pulse Oximetry (%)

## 2018-12-10 ENCOUNTER — HOSPITAL ENCOUNTER (INPATIENT)
Dept: HOSPITAL 74 - YASAS | Age: 48
LOS: 4 days | Discharge: HOME | End: 2018-12-14
Attending: INTERNAL MEDICINE | Admitting: INTERNAL MEDICINE
Payer: COMMERCIAL

## 2018-12-10 VITALS — BODY MASS INDEX: 28.9 KG/M2

## 2018-12-10 DIAGNOSIS — M12.9: ICD-10-CM

## 2018-12-10 DIAGNOSIS — F19.24: ICD-10-CM

## 2018-12-10 DIAGNOSIS — F41.8: ICD-10-CM

## 2018-12-10 DIAGNOSIS — E87.6: ICD-10-CM

## 2018-12-10 DIAGNOSIS — R56.9: ICD-10-CM

## 2018-12-10 DIAGNOSIS — F31.81: ICD-10-CM

## 2018-12-10 DIAGNOSIS — F19.282: ICD-10-CM

## 2018-12-10 DIAGNOSIS — Z86.73: ICD-10-CM

## 2018-12-10 DIAGNOSIS — G89.29: ICD-10-CM

## 2018-12-10 DIAGNOSIS — E78.00: ICD-10-CM

## 2018-12-10 DIAGNOSIS — I10: ICD-10-CM

## 2018-12-10 DIAGNOSIS — S43.401A: ICD-10-CM

## 2018-12-10 DIAGNOSIS — R55: ICD-10-CM

## 2018-12-10 DIAGNOSIS — R76.11: ICD-10-CM

## 2018-12-10 DIAGNOSIS — F31.9: ICD-10-CM

## 2018-12-10 DIAGNOSIS — F10.230: Primary | ICD-10-CM

## 2018-12-10 DIAGNOSIS — F17.210: ICD-10-CM

## 2018-12-10 DIAGNOSIS — Z86.19: ICD-10-CM

## 2018-12-10 DIAGNOSIS — M54.5: ICD-10-CM

## 2018-12-10 PROCEDURE — G0008 ADMIN INFLUENZA VIRUS VAC: HCPCS

## 2018-12-10 PROCEDURE — HZ2ZZZZ DETOXIFICATION SERVICES FOR SUBSTANCE ABUSE TREATMENT: ICD-10-PCS | Performed by: INTERNAL MEDICINE

## 2018-12-10 RX ADMIN — Medication SCH MG: at 22:26

## 2018-12-10 RX ADMIN — Medication SCH TAB: at 11:06

## 2018-12-10 RX ADMIN — Medication PRN MG: at 22:27

## 2018-12-10 RX ADMIN — HYDROXYZINE PAMOATE PRN MG: 50 CAPSULE ORAL at 16:32

## 2018-12-10 RX ADMIN — HYDROXYZINE PAMOATE PRN MG: 50 CAPSULE ORAL at 13:12

## 2018-12-10 RX ADMIN — SERTRALINE HYDROCHLORIDE SCH MG: 50 TABLET ORAL at 15:36

## 2018-12-10 NOTE — CONSULT
[facility] Psychiatric Consult





- Data


Date of interview: 12/10/18


Admission source: [facility]


Identifying data: This is a 48 years old male, single father of one, unemployed

, homeless, on SSI support, with history of Bipolar Disorder, with no 

psychiatric hospitalization history,  with alcohol and Nicotine dependence,is 

reporting withdrawal symptoms and  seeking for detox,last detox 11/01/18 was 

not completed


Substance Abuse History: Smoking history: Current every day smoker.  Have you 

smoked in the past 12 months: No.  Aproximately how many cigarettes per day: 4.

  Cigars Per Day: 0.  Hx Chewing Tobacco Use: No.  Initiated information on 

smoking cessation: Yes.  'Breaking Loose' booklet given: 12/10/18.  - Substance 

& Tx. History.  Hx Alcohol Use: Yes.  Hx Substance Use: No.  Substance Use Type

: Alcohol.  Hx Substance Use Treatment: Yes ([facility] 11/01/18).  - Substances 

Abused.  ** Alcohol.  Route: Oral.  Frequency: Daily.  Amount used: 3 pints of 

vodka/10 of 24 ozs of beer.  Age of first use: 12.  Date of Last Use: 12/09/18


Medical History: History of CVA, htn, Syphilis history, history of positive PPD

, R, Shoulder injury, Syphilis history, Hyperlipidemia, Arthritis, Seizure 

history


Psychiatric History: Patient reports to carry Bipolar Disorder, reports no 

psychiatriv hospitalization history, stable opn :  Seroquel 200mg po qhs.  

Zoloft 100mg poqd.  Dnies suicidal and homicidal history


Physical/Sexual Abuse/Trauma History: Denies


Additional Comment: Seroquel 200mg po qhs.  Zoloft 100mg poqd





Mental Status Exam





- Mental Status Exam


Alert and Oriented to: Person


Cognitive Function: Fair


Patient Appearance: Well Groomed


Mood: Apprehensive


Affect: Mood Congruent


Patient Behavior: Cooperative


Speech Pattern: Appropriate


Voice Loudness: Normal


Thought Process: Goal Oriented


Thought Disorder: Being Controlled


Hallucinations: Denies


Suicidal Ideation: Denies


Homicidal Ideation: Denies


Insight/Judgement: Fair


Sleep: Difficulty falling asleep


Appetite: Fair


Muscle strength/Tone: Normal


Gait/Station: Normal


Additional Comments: Seroquel 200mg po qhs.  Zoloft 100mg poqd





Psychiatric Findings





- Problem List (Axis 1, 2,3)


(1) Alcohol dependence


Current Visit: No   Status: Acute   





(2) Alcohol dependence with uncomplicated intoxication


Current Visit: No   Status: Acute   





(3) Alcohol dependence with withdrawal, uncomplicated


Current Visit: No   Status: Acute   





(4) Drug-induced mood disorder


Current Visit: No   Status: Acute   





(5) History of syphilis


Current Visit: No   Status: Acute   





(6) Insomnia secondary to depression with anxiety


Current Visit: No   Status: Acute   





(7) Old cerebrovascular accident (CVA) without late effect


Current Visit: No   Status: Acute   





(8) Positive PPD, treated


Current Visit: No   Status: Acute   





(9) Shoulder abrasion


Current Visit: No   Status: Acute   


Qualifiers: 


   Encounter type: initial encounter   Laterality: left   Qualified Code(s): 

S40.212A - Abrasion of left shoulder, initial encounter   





(10) Sprain of right shoulder


Current Visit: No   Status: Acute   





(11) Substance induced mood disorder


Current Visit: No   Status: Acute   





(12) Substance-induced sleep disorder


Current Visit: No   Status: Acute   





(13) Syncope


Current Visit: No   Status: Acute   


Qualifiers: 


   Syncope type: unspecified   Qualified Code(s): R55 - Syncope and collapse   





(14) Alcohol dependence in controlled environment


Current Visit: No   Status: Chronic   





(15) Arthritis


Current Visit: No   Status: Chronic   Comment: BOTH KNEES AND LEFT WRIST   





(16) Bipolar 1 disorder


Current Visit: No   Status: Chronic   Comment: History.   





(17) Chronic back pain


Current Visit: No   Status: Chronic   





(18) Hyperlipidemia


Current Visit: No   Status: Chronic   


Qualifiers: 


   Hyperlipidemia type: pure hypercholesterolemia   Qualified Code(s): E78.00 - 

Pure hypercholesterolemia, unspecified; E78.0 - Pure hypercholesterolemia   


Comment: NO CURRENT MED   





(19) Nicotine dependence


Current Visit: No   Status: Chronic   


Qualifiers: 


   Nicotine product type: cigarettes   Substance use status: uncomplicated   

Qualified Code(s): F17.210 - Nicotine dependence, cigarettes, uncomplicated   





- Initial Treatment Plan


Initial Treatment Plan: Seroquel 200mg po qhs.  Zoloft 100mg poqd

## 2018-12-10 NOTE — HP
CIWA Score


Nausea/Vomitin


Muscle Tremors: 2


Anxiety: 2


Agitation: 2


Paroxysmal Sweats: 1-Minimal Palms Moist


Orientation: 0-Oriented


Tacttile Disturbances: 1-Very Mild Itch/Numbness


Auditory Disturbances: 1-Very Mild


Visual Disturbances: 0-None


Headache: 2-Mild


CIWA-Ar Total Score: 13





- Admission Criteria


[facility]AS Guidelines: Admission for Medically Managed Detox: 


Requires at least one of the followin. CIWA greater than 12


2. Seizures within the past 24 hours


3. Delirium tremens within the past 24 hours


4. Hallucinations within the past 24 hours


5. Acute intervention needed for co  occurring medical disorder


6. Acute intervention needed for co  occurring psychiatric disorder


7. Severe withdrawal that cannot be handled at a lower level of care (continued


    vomiting, continued diarrhea, abnormal vital signs) requiring intravenous


    medication and/or fluids


8. Pregnancy





Patient presents the following: CIWA greater than 12


Admission Criteria Met: Admission criteria met





Admission ROS [facility]





- HPI


Chief Complaint: 





i need help to stop drinking alcohol


Allergies/Adverse Reactions: 


 Allergies











Allergy/AdvReac Type Severity Reaction Status Date / Time


 


No Known Allergies Allergy   Verified 12/10/18 09:01











History of Present Illness: 





this 48 years old male with alcohol dependence,seeking detox,withdrawal symptom,

last detox 18 not completed


seen in [place] last night


syncope alcohol related


seizure last 


nicotine dependence


bipolar disorder,non compliance


no significant period of sobriety





Exam Limitations: No Limitations





- Ebola screening


Have you traveled outside of the country in the last 21 days: No


Have you had contact with anyone from an Ebola affected area: No


Do you have a fever: No





- Review of Systems


Constitutional: Loss of Appetite, Malaise, Night Sweats, Changes in sleep, 

Weakness


EENT: reports: Nose Congestion


Respiratory: reports: No Symptoms reported


Cardiac: reports: Palpitations


GI: reports: Nausea, Poor Appetite, Abdominal cramping


: reports: No Symptoms Reported


Musculoskeletal: reports: Back Pain, Muscle Pain


Integumentary: reports: Dryness


Neuro: reports: Headache, Tremors


Endocrine: reports: No Symptoms Reported


Hematology: reports: No Symptoms Reported


Psychiatric: reports: No Sypmtoms Reported, Judgement Intact, Mood/Affect 

Appropiate, Orientated x3, other (bipolar disorder)





Patient History





- Patient Medical History


Hx Anemia: No


Hx Asthma: No


Hx Chronic Obstructive Pulmonary Disease (COPD): No


Hx Cancer: No


Hx Cardiac Disorders: No


Hx Congestive Heart Failure: No


Hx Hypertension: No


Hx Hypercholesterolemia: Yes (NO MEDS)


Hx Pacemaker: No


HX Cerebrovascular Accident: Yes ()


Hx Seizures: No


Hx Dementia: No


Hx Diabetes: No


Hx Gastrointestinal Disorders: No


Hx Liver Disease: No


Hx Genitourinary Disorders: No


Hx Sexually Transmitted Disorders: Yes (syphilis at age 15)


Hx Renal Disease (ESRD): No


Hx Thyroid Disease: No


Hx Human Immunodeficiency Virus (HIV): No (NEGATIVE HX last negative )


Hx Hepatitis C: No


Hx Depression: Yes


Hx Suicide Attempt: No


Hx Bipolar Disorder: Yes (on Seroquel and Zoloft as per Hx.)


Hx Schizophrenia: No





- Patient Surgical History


Past Surgical History: No


Hx Neurologic Surgery: No


Hx Cataract Extraction: No


Hx Cardiac Surgery: No


Hx Lung Surgery: No


Hx Breast Surgery: No


Hx Breast Biopsy: No


Hx Abdominal Surgery: No


Hx Appendectomy: No


Hx Cholecystectomy: No


Hx Genitourinary Surgery: No


Hx  Section: No


Hx Orthopedic Surgery: No


Anesthesia Reaction: No





- PPD History


Previous Implant?: Yes


Documented Results: Positive w/o proof


Results: CXR neg 2017


PPD to be Administered?: No





- Smoking Cessation


Smoking history: Current every day smoker


Have you smoked in the past 12 months: No


Aproximately how many cigarettes per day: 4


Cigars Per Day: 0


Hx Chewing Tobacco Use: No


Initiated information on smoking cessation: Yes


'Breaking Loose' booklet given: 12/10/18





- Substance & Tx. History


Hx Alcohol Use: Yes


Hx Substance Use: No


Substance Use Type: Alcohol


Hx Substance Use Treatment: Yes ([facility] 18)





- Substances Abused


  ** Alcohol


Route: Oral


Frequency: Daily


Amount used: 3 pints of vodka/10 of 24 ozs of beer


Age of first use: 12


Date of Last Use: 17





Family Disease History





- Family Disease History


Family Disease History: Diabetes: Mother (HTN), Other: Father (alcohol,)

, Mother





Admission Physical Exam [facility]





- Vital Signs


Vital Signs: 





 Vital Signs











Temperature  99.8 F H  12/10/18 08:49


 


Pulse Rate  122 H  12/10/18 08:49


 


Respiratory Rate  20   12/10/18 08:49


 


Blood Pressure  141/90   12/10/18 08:49


 


O2 Sat by Pulse Oximetry (%)      














- Physical


General Appearance: Yes: Moderate Distress, Tremorous, Sweating, Anxious


HEENTM: Yes: Within Normal Limits, EOMI, ROSEMARIE, Pharynx Normal


Respiratory: Yes: Lungs Clear, Normal Breath Sounds, No Respiratory Distress


Neck: Yes: Within Normal Limits, Supple, Trachea in good position


Breast: Yes: Within Normal Limits


Cardiology: Yes: Tachycardia


Abdominal: Yes: Within Normal Limits, Normal Bowel Sounds, Non Tender, Flat, 

Soft


Genitourinary: Yes: Within Normal Limits


Back: Yes: Muscle Spasm


Musculoskeletal: Yes: Back pain, Muscle Pain


Extremities: Yes: Tremors


Neurological: Yes: CNs II-XII NML intact, Fully Oriented, Alert, Motor Strength 

5/5


Integumentary: Yes: Dry


Lymphatic: Yes: Within Normal Limits





- Diagnostic


(1) Alcohol dependence with withdrawal, uncomplicated


Current Visit: No   Status: Acute   





(2) History of syphilis


Current Visit: No   Status: Acute   





(3) Positive PPD, treated


Current Visit: No   Status: Acute   





(4) Bipolar disorder


Current Visit: No   Status: Chronic   





(5) Seizure


Current Visit: No   Status: Suspected   


Qualifiers: 


 





(6) Syncope


Current Visit: No   Status: Acute   


Qualifiers: 


   Syncope type: unspecified   Qualified Code(s): R55 - Syncope and collapse   





(7) Old cerebrovascular accident (CVA) without late effect


Current Visit: Yes   Status: Acute   





(8) Positive PPD


Current Visit: No   Status: Acute   





(9) Hyperlipidemia


Current Visit: No   Status: Chronic   


Qualifiers: 


   Hyperlipidemia type: pure hypercholesterolemia   Qualified Code(s): E78.00 - 

Pure hypercholesterolemia, unspecified; E78.0 - Pure hypercholesterolemia   


Comment: NO CURRENT MED   





(10) Nicotine dependence


Current Visit: No   Status: Chronic   


Qualifiers: 


   Nicotine product type: cigarettes   Substance use status: uncomplicated   

Qualified Code(s): F17.210 - Nicotine dependence, cigarettes, uncomplicated   





(11) Alcohol related seizure


Current Visit: No   Status: Suspected   





(12) Alcohol dependence with uncomplicated intoxication


Current Visit: Yes   Status: Acute   





Cleared for Admission S





- Detox or Rehab


[facility] Level of Care: Medically Managed


Detox Regimen/Protocol: Librium





BHS Breath Alcohol Content


Breath Alcohol Content: 0.323

## 2018-12-11 LAB
ALBUMIN SERPL-MCNC: 3.7 G/DL (ref 3.4–5)
ALP SERPL-CCNC: 72 U/L (ref 45–117)
ALT SERPL-CCNC: 53 U/L (ref 13–61)
ANION GAP SERPL CALC-SCNC: 10 MMOL/L (ref 8–16)
AST SERPL-CCNC: 57 U/L (ref 15–37)
BILIRUB SERPL-MCNC: 1.2 MG/DL (ref 0.2–1)
BUN SERPL-MCNC: 10 MG/DL (ref 7–18)
CALCIUM SERPL-MCNC: 9 MG/DL (ref 8.5–10.1)
CHLORIDE SERPL-SCNC: 102 MMOL/L (ref 98–107)
CO2 SERPL-SCNC: 29 MMOL/L (ref 21–32)
CREAT SERPL-MCNC: 0.8 MG/DL (ref 0.55–1.3)
DEPRECATED RDW RBC AUTO: 16 % (ref 11.9–15.9)
GLUCOSE SERPL-MCNC: 108 MG/DL (ref 74–106)
HCT VFR BLD CALC: 38.5 % (ref 35.4–49)
HGB BLD-MCNC: 12.7 GM/DL (ref 11.7–16.9)
MCH RBC QN AUTO: 34.1 PG (ref 25.7–33.7)
MCHC RBC AUTO-ENTMCNC: 33.1 G/DL (ref 32–35.9)
MCV RBC: 103.2 FL (ref 80–96)
PLATELET # BLD AUTO: 40 K/MM3 (ref 134–434)
PMV BLD: 9.4 FL (ref 7.5–11.1)
POTASSIUM SERPLBLD-SCNC: 3 MMOL/L (ref 3.5–5.1)
PROT SERPL-MCNC: 6.7 G/DL (ref 6.4–8.2)
RBC # BLD AUTO: 3.73 M/MM3 (ref 4–5.6)
SODIUM SERPL-SCNC: 141 MMOL/L (ref 136–145)
WBC # BLD AUTO: 3.9 K/MM3 (ref 4–10)

## 2018-12-11 RX ADMIN — POTASSIUM CHLORIDE SCH MEQ: 1500 TABLET, EXTENDED RELEASE ORAL at 13:42

## 2018-12-11 RX ADMIN — Medication SCH MG: at 21:57

## 2018-12-11 RX ADMIN — HYDROXYZINE PAMOATE PRN MG: 50 CAPSULE ORAL at 00:17

## 2018-12-11 RX ADMIN — SERTRALINE HYDROCHLORIDE SCH MG: 50 TABLET ORAL at 10:03

## 2018-12-11 RX ADMIN — ACETAMINOPHEN PRN MG: 325 TABLET ORAL at 21:36

## 2018-12-11 RX ADMIN — Medication PRN MG: at 23:20

## 2018-12-11 RX ADMIN — Medication SCH TAB: at 10:03

## 2018-12-11 NOTE — PN
[facility] CIWA





- CIWA Score


Nausea/Vomitin-No Nausea/No Vomiting


Muscle Tremors: 3


Anxiety: 3


Agitation: 3


Paroxysmal Sweats: 3


Orientation: 0-Oriented


Tacttile Disturbances: 0-None


Auditory Disturbances: 0-None


Visual Disturbances: 0-None


Headache: 0-None Present


CIWA-Ar Total Score: 12





BHS Progress Note (SOAP)


Subjective: 





sweats


chills


body aches


dizzy


interrupted sleep


stomach ache


Objective: 





18 12:19


 Vital Signs











Temperature  97.8 F   18 09:50


 


Pulse Rate  66   18 09:50


 


Respiratory Rate  18   18 09:50


 


Blood Pressure  128/60   18 09:50


 


O2 Sat by Pulse Oximetry (%)      








 Laboratory Tests











  18





  07:00 07:00


 


WBC  3.9 L 


 


RBC  3.73 L 


 


Hgb  12.7 


 


Hct  38.5 


 


MCV  103.2 H 


 


MCH  34.1 H 


 


MCHC  33.1 


 


RDW  16.0 H 


 


Sodium   141


 


Potassium   3.0 L


 


Chloride   102


 


Carbon Dioxide   29


 


Anion Gap   10


 


BUN   10


 


Creatinine   0.8


 


Creat Clearance w eGFR   > 60


 


Random Glucose   108 H


 


Calcium   9.0


 


Total Bilirubin   1.2 H


 


AST   57 H


 


ALT   53


 


Alkaline Phosphatase   72


 


Total Protein   6.7


 


Albumin   3.7








labs noted


potassium 3.0; will order k-dur to replenish


aaox3


lying in bed


no acute distress


Assessment: 





18 12:19


withdrawal sx


Plan: 





continue detox


increase fluids


kdur ordered

## 2018-12-12 RX ADMIN — RANITIDINE SCH MG: 150 TABLET ORAL at 12:43

## 2018-12-12 RX ADMIN — ACETAMINOPHEN PRN MG: 325 TABLET ORAL at 22:03

## 2018-12-12 RX ADMIN — SERTRALINE HYDROCHLORIDE SCH MG: 50 TABLET ORAL at 10:17

## 2018-12-12 RX ADMIN — RANITIDINE SCH MG: 150 TABLET ORAL at 22:03

## 2018-12-12 RX ADMIN — Medication SCH TAB: at 10:17

## 2018-12-12 RX ADMIN — POTASSIUM CHLORIDE SCH MEQ: 1500 TABLET, EXTENDED RELEASE ORAL at 10:17

## 2018-12-12 RX ADMIN — Medication SCH MG: at 22:02

## 2018-12-12 RX ADMIN — HYDROXYZINE PAMOATE PRN MG: 50 CAPSULE ORAL at 17:19

## 2018-12-12 NOTE — PN
[facility] CIWA





- CIWA Score


Nausea/Vomitin-No Nausea/No Vomiting


Muscle Tremors: 3


Anxiety: 3


Agitation: 3


Paroxysmal Sweats: 3


Orientation: 0-Oriented


Tacttile Disturbances: 0-None


Auditory Disturbances: 0-None


Visual Disturbances: 0-None


Headache: 0-None Present


CIWA-Ar Total Score: 12





BHS Progress Note (SOAP)


Subjective: 





stomach ache


acid reflux


body aches


Objective: 





18 12:17


 Vital Signs











Temperature  98.6 F   18 09:37


 


Pulse Rate  97 H  18 09:37


 


Respiratory Rate  18   18 09:37


 


Blood Pressure  102/75   18 09:37


 


O2 Sat by Pulse Oximetry (%)      








 Laboratory Tests











  18





  07:00 07:00 07:00


 


WBC  3.9 L  


 


RBC  3.73 L  


 


Hgb  12.7  


 


Hct  38.5  


 


MCV  103.2 H  


 


MCH  34.1 H  


 


MCHC  33.1  


 


RDW  16.0 H  


 


Plt Count  40 L D  


 


MPV  9.4  D  


 


Platelet Comment  No clumping noted  


 


Sodium   141 


 


Potassium   3.0 L 


 


Chloride   102 


 


Carbon Dioxide   29 


 


Anion Gap   10 


 


BUN   10 


 


Creatinine   0.8 


 


Creat Clearance w eGFR   > 60 


 


Random Glucose   108 H 


 


Calcium   9.0 


 


Total Bilirubin   1.2 H 


 


AST   57 H 


 


ALT   53 


 


Alkaline Phosphatase   72 


 


Total Protein   6.7 


 


Albumin   3.7 


 


RPR Titer    Nonreactive








aaox3


ambulating


no acute distress


Assessment: 





18 12:18


withdrawal sx


Plan: 





continue detox


increase fluids


zantac 150mg bid

## 2018-12-12 NOTE — EKG
Test Reason : 

Blood Pressure : ***/*** mmHG

Vent. Rate : 102 BPM     Atrial Rate : 102 BPM

   P-R Int : 156 ms          QRS Dur : 088 ms

    QT Int : 340 ms       P-R-T Axes : 029 028 044 degrees

   QTc Int : 443 ms

 

SINUS TACHYCARDIA

NONSPECIFIC T WAVE ABNORMALITY

ABNORMAL ECG

WHEN COMPARED WITH ECG OF 01-NOV-2018 10:58,

NO SIGNIFICANT CHANGE WAS FOUND

Confirmed by FABIAN PRUETT MD ([id number]) on 12/12/2018 9:34:21 AM

 

Referred By:             Confirmed By:FABIAN PRUETT MD

## 2018-12-13 RX ADMIN — POTASSIUM CHLORIDE SCH MEQ: 1500 TABLET, EXTENDED RELEASE ORAL at 10:42

## 2018-12-13 RX ADMIN — Medication SCH TAB: at 10:43

## 2018-12-13 RX ADMIN — RANITIDINE SCH MG: 150 TABLET ORAL at 10:42

## 2018-12-13 RX ADMIN — Medication SCH MG: at 22:19

## 2018-12-13 RX ADMIN — Medication PRN MG: at 00:46

## 2018-12-13 RX ADMIN — RANITIDINE SCH MG: 150 TABLET ORAL at 22:19

## 2018-12-13 RX ADMIN — SERTRALINE HYDROCHLORIDE SCH MG: 50 TABLET ORAL at 10:42

## 2018-12-13 RX ADMIN — Medication PRN MG: at 22:19

## 2018-12-13 NOTE — PN
BHS Progress Note (SOAP)


Subjective: 





feeling better


stomach ache


Objective: 





12/13/18 12:21


 Vital Signs











Temperature  97.7 F   12/13/18 09:13


 


Pulse Rate  58 L  12/13/18 09:13


 


Respiratory Rate  18   12/13/18 09:13


 


Blood Pressure  110/63   12/13/18 09:13


 


O2 Sat by Pulse Oximetry (%)      








aaox3


ambulating


no acute distress


Assessment: 





12/13/18 12:21


withdrawal sx


Plan: 





continue detox


increase fluids


MOM


d/c in am

## 2018-12-14 VITALS — HEART RATE: 68 BPM | DIASTOLIC BLOOD PRESSURE: 74 MMHG | TEMPERATURE: 98.2 F | SYSTOLIC BLOOD PRESSURE: 133 MMHG

## 2018-12-14 RX ADMIN — RANITIDINE SCH MG: 150 TABLET ORAL at 10:26

## 2018-12-14 RX ADMIN — POTASSIUM CHLORIDE SCH MEQ: 1500 TABLET, EXTENDED RELEASE ORAL at 10:26

## 2018-12-14 RX ADMIN — SERTRALINE HYDROCHLORIDE SCH MG: 50 TABLET ORAL at 10:26

## 2018-12-14 RX ADMIN — Medication SCH TAB: at 10:26

## 2018-12-14 NOTE — DS
[facility] Detox Discharge Summary


Admission Date: 


12/10/18





Discharge Date: 12/14/18





- History


Present History: Alcohol Dependence





- Physical Exam Results


Vital Signs: 


 Vital Signs











Temperature  97.7 F   12/14/18 06:37


 


Pulse Rate  69   12/14/18 06:37


 


Respiratory Rate  18   12/14/18 06:37


 


Blood Pressure  105/72   12/14/18 06:37


 


O2 Sat by Pulse Oximetry (%)      














- Treatment


Hospital Course: Detox Protocol Followed, Detoxed Safely, Responded well, 

Discharged Condition Good, Rehab Referral Accepted





- Medication


Discharge Medications: 


Ambulatory Orders





Quetiapine Fumarate [Seroquel -] 200 mg PO HS 02/21/18 


Sertraline HCl [Zoloft -] 100 mg PO DAILY 02/21/18 











- Diagnosis


(1) Alcohol dependence with uncomplicated intoxication


Current Visit: Yes   Status: Chronic   





(2) Old cerebrovascular accident (CVA) without late effect


Current Visit: Yes   Status: Chronic   





(3) Drug-induced mood disorder


Current Visit: No   Status: Acute   





(4) History of syphilis


Current Visit: No   Status: Chronic   





(5) Insomnia secondary to depression with anxiety


Current Visit: No   Status: Acute   





(6) Positive PPD, treated


Current Visit: No   Status: Acute   





(7) Sprain of right shoulder


Current Visit: No   Status: Acute   





(8) Substance induced mood disorder


Current Visit: No   Status: Acute   





(9) Substance-induced sleep disorder


Current Visit: No   Status: Acute   





(10) Syncope


Current Visit: No   Status: Acute   


Qualifiers: 


   Syncope type: unspecified   Qualified Code(s): R55 - Syncope and collapse   





(11) Arthritis


Current Visit: No   Status: Chronic   





(12) Bipolar 1 disorder


Current Visit: No   Status: Chronic   





(13) Bipolar disorder


Current Visit: No   Status: Chronic   





(14) Chronic back pain


Current Visit: Yes   Status: Chronic   


Qualifiers: 


   Back pain location: low back pain   Back pain laterality: unspecified 





(15) Depression


Current Visit: No   Status: Chronic   





(16) Hyperlipidemia


Current Visit: Yes   Status: Chronic   


Qualifiers: 


   Hyperlipidemia type: pure hypercholesterolemia   Qualified Code(s): E78.00 - 

Pure hypercholesterolemia, unspecified; E78.0 - Pure hypercholesterolemia   





(17) Nicotine dependence


Current Visit: Yes   Status: Chronic   


Qualifiers: 


   Nicotine product type: cigarettes   Substance use status: uncomplicated   

Qualified Code(s): F17.210 - Nicotine dependence, cigarettes, uncomplicated   





(18) Alcohol related seizure


Current Visit: Yes   Status: Suspected   





(19) Bipolar II disorder


Current Visit: No   Status: Suspected   





- AMA


Did Patient Leave Against Medical Advice: No (referred to [facility] rehab)

## 2019-08-14 ENCOUNTER — HOSPITAL ENCOUNTER (INPATIENT)
Dept: HOSPITAL 74 - YASAS | Age: 49
LOS: 5 days | Discharge: OTHER INSTITUTION | End: 2019-08-19
Attending: SURGERY | Admitting: SURGERY
Payer: COMMERCIAL

## 2019-08-14 VITALS — BODY MASS INDEX: 30.7 KG/M2

## 2019-08-14 DIAGNOSIS — G47.00: ICD-10-CM

## 2019-08-14 DIAGNOSIS — F10.230: Primary | ICD-10-CM

## 2019-08-14 DIAGNOSIS — R76.11: ICD-10-CM

## 2019-08-14 DIAGNOSIS — R73.9: ICD-10-CM

## 2019-08-14 DIAGNOSIS — F19.24: ICD-10-CM

## 2019-08-14 DIAGNOSIS — E78.5: ICD-10-CM

## 2019-08-14 DIAGNOSIS — F31.9: ICD-10-CM

## 2019-08-14 DIAGNOSIS — R00.0: ICD-10-CM

## 2019-08-14 DIAGNOSIS — Z86.69: ICD-10-CM

## 2019-08-14 DIAGNOSIS — Z87.438: ICD-10-CM

## 2019-08-14 DIAGNOSIS — F17.213: ICD-10-CM

## 2019-08-14 DIAGNOSIS — Z86.73: ICD-10-CM

## 2019-08-14 LAB
ALBUMIN SERPL-MCNC: 3.4 G/DL (ref 3.4–5)
ALP SERPL-CCNC: 83 U/L (ref 45–117)
ALT SERPL-CCNC: 22 U/L (ref 13–61)
ANION GAP SERPL CALC-SCNC: 11 MMOL/L (ref 8–16)
AST SERPL-CCNC: 27 U/L (ref 15–37)
BILIRUB SERPL-MCNC: 0.7 MG/DL (ref 0.2–1)
BUN SERPL-MCNC: 10.5 MG/DL (ref 7–18)
CALCIUM SERPL-MCNC: 8.4 MG/DL (ref 8.5–10.1)
CHLORIDE SERPL-SCNC: 102 MMOL/L (ref 98–107)
CO2 SERPL-SCNC: 24 MMOL/L (ref 21–32)
CREAT SERPL-MCNC: 0.8 MG/DL (ref 0.55–1.3)
DEPRECATED RDW RBC AUTO: 13.5 % (ref 11.9–15.9)
GLUCOSE SERPL-MCNC: 171 MG/DL (ref 74–106)
HCT VFR BLD CALC: 36.9 % (ref 35.4–49)
HGB BLD-MCNC: 12.5 GM/DL (ref 11.7–16.9)
MCH RBC QN AUTO: 33.2 PG (ref 25.7–33.7)
MCHC RBC AUTO-ENTMCNC: 34 G/DL (ref 32–35.9)
MCV RBC: 97.8 FL (ref 80–96)
PLATELET # BLD AUTO: 210 K/MM3 (ref 134–434)
PMV BLD: 7.6 FL (ref 7.5–11.1)
POTASSIUM SERPLBLD-SCNC: 3.8 MMOL/L (ref 3.5–5.1)
PROT SERPL-MCNC: 7.2 G/DL (ref 6.4–8.2)
RBC # BLD AUTO: 3.77 M/MM3 (ref 4–5.6)
SODIUM SERPL-SCNC: 137 MMOL/L (ref 136–145)
WBC # BLD AUTO: 7.4 K/MM3 (ref 4–10)

## 2019-08-14 PROCEDURE — HZ2ZZZZ DETOXIFICATION SERVICES FOR SUBSTANCE ABUSE TREATMENT: ICD-10-PCS | Performed by: ALLERGY & IMMUNOLOGY

## 2019-08-14 RX ADMIN — HYDROXYZINE HYDROCHLORIDE PRN MG: 25 TABLET, FILM COATED ORAL at 15:41

## 2019-08-14 RX ADMIN — Medication SCH TAB: at 10:39

## 2019-08-14 RX ADMIN — Medication SCH MG: at 22:08

## 2019-08-14 RX ADMIN — Medication PRN MG: at 22:08
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
## 2019-08-14 NOTE — CONSULT
[facility] Psychiatric Consult





- Data


Date of interview: 08/14/19


Admission source: [facility]


Identifying data: This is one of multiple admissions to [facility] for this 49 y/

o  male self-referred for detoxification (alcohol). Examined at [address] [place] [address]. Patient is single,a father of four, homeless, unemployed and supported 

on SSI benefits.


Substance Abuse History: Confirmed by patient in this session. Details in 

current BHS report as follows : Smoking history: Current some day smoker.  Have 

you smoked in the past 12 months: Yes.  Aproximately how many cigarettes per day

: 4.  Cigars Per Day: 0.  Hx Chewing Tobacco Use: No.  Initiated information on 

smoking cessation: Yes.  'Breaking Loose' booklet given: 08/14/19.  - Substance 

& Tx. History.  Hx Alcohol Use: Yes (see history).  Hx Substance Use: No.  

Substance Use Type: Alcohol.  Hx Substance Use Treatment: Yes (multiple detoxes 

and hospitalizations).  - Substances abused.  ** Alcohol.  Substance route: 

Oral.  Frequency: Daily.  Amount used: 15 24oz cans of beer, 1 bottles of $5 

vodka.  Age of first use: 12.  Date of last use: 08/14/19


Medical History: Medical profile is remarkable for positive PPD (treated), 

antecedent of CVA (no residual effects), hypercholesterolemia, past treatment 

for syphilis, arthritis and a distant history of withdrawal-related seizures.


Psychiatric History: Patient endorses a history of multiple psychiatric 

hospitalizations. Diagnosed with Bipolar Disorder. Known to [facility], [facility], [facility]). Onset of psychiatric 

disturbances in 2007. Mr Small admits to chronic non-adherence to OPD care. 

He is currently prescribed seroquel 200 mg/hs. No reported history of suicide 

attempts.


Physical/Sexual Abuse/Trauma History: Patient denies history of abuse.


Additional Comment: Urine drug screen results: BZO-Benzodiazepines. Noted.





Mental Status Exam





- Mental Status Exam


Alert and Oriented to: Time, Place, Person


Cognitive Function: Good


Patient Appearance: Well Groomed


Mood: Nervous, Withdrawn


Affect: Mood Congruent, Constricted


Patient Behavior: Fatigued, Cooperative


Speech Pattern: Clear, Appropriate


Voice Loudness: Normal


Thought Process: Intact, Goal Oriented


Thought Disorder: Not Present


Hallucinations: Denies


Suicidal Ideation: Denies


Homicidal Ideation: Denies


Insight/Judgement: Poor


Sleep: Poorly, Difficulty falling asleep


Appetite: Good


Muscle strength/Tone: Normal


Gait/Station: Normal





Psychiatric Findings





- Problem List (Axis 1, 2,3)


(1) Alcohol dependence with uncomplicated withdrawal


Current Visit: Yes   Status: Acute   





(2) Nicotine dependence


Current Visit: Yes   Status: Chronic   


Qualifiers: 


   Nicotine product type: cigarettes   Substance use status: uncomplicated   

Qualified Code(s): F17.210 - Nicotine dependence, cigarettes, uncomplicated   





(3) Substance induced mood disorder


Current Visit: Yes   Status: Chronic   





(4) Bipolar disorder


Current Visit: Yes   Status: Chronic   Comment: By history.    





(5) Insomnia


Current Visit: Yes   Status: Chronic   





- Initial Treatment Plan


Initial Treatment Plan: Psychoeducation. Detoxification. Sleep hygiene. 

Support. Relapse prevention (MAT) suggested to patient. Expresses interest in 

rehabilitation (social work team will follow). AA meetings. Groups. Resumed : 

seroquel 200 mg po hs + zoloft 100 mg po daily. Side effects/benefits discussed 

with patient. Mr Small is in agreement with this plan of care. Gave his 

consent (verbally) to MD. Castillo.

## 2019-08-15 RX ADMIN — Medication PRN MG: at 22:18

## 2019-08-15 RX ADMIN — Medication SCH TAB: at 10:13

## 2019-08-15 RX ADMIN — SERTRALINE HYDROCHLORIDE SCH MG: 50 TABLET ORAL at 10:13

## 2019-08-15 RX ADMIN — Medication SCH MG: at 22:17

## 2019-08-15 RX ADMIN — METHOCARBAMOL PRN MG: 500 TABLET ORAL at 22:18

## 2019-08-15 NOTE — PN
[facility] CIWA





- CIWA Score


Nausea/Vomitin-Mild Nausea/No Vomiting


Muscle Tremors: 4-Moderate,w/Arms Extend


Anxiety: 3


Agitation: 3


Paroxysmal Sweats: 2


Orientation: 0-Oriented


Tacttile Disturbances: 1-Very Mild Itch/Numbness


Auditory Disturbances: 0-None


Visual Disturbances: 0-None


Headache: 1-Very Mild


CIWA-Ar Total Score: 15





BHS Progress Note (SOAP)


Subjective: 





49 years old male admitted on 19 for acute alcohol withdrawal sx 

management


feeling tired today resting on bed limited conversation with staff 


tremor sweat


Objective: 





08/15/19 15:49


 Vital Signs











Temperature  98.2 F   08/15/19 13:43


 


Pulse Rate  56 L  08/15/19 13:43


 


Respiratory Rate  19   08/15/19 13:43


 


Blood Pressure  111/77   08/15/19 13:43


 


O2 Sat by Pulse Oximetry (%)      








 Laboratory Last Values











WBC  7.4 K/mm3 (4.0-10.0)   19  09:30    


 


RBC  3.77 M/mm3 (4.00-5.60)  L  19  09:30    


 


Hgb  12.5 GM/dL (11.7-16.9)   19  09:30    


 


Hct  36.9 % (35.4-49)   19  09:30    


 


MCV  97.8 fl (80-96)  H  19  09:30    


 


MCH  33.2 pg (25.7-33.7)   19  09:30    


 


MCHC  34.0 g/dl (32.0-35.9)   19  09:30    


 


RDW  13.5 % (11.9-15.9)  D 19  09:30    


 


Plt Count  210 K/MM3 (134-434)  D 19  09:30    


 


MPV  7.6 fl (7.5-11.1)  D 19  09:30    


 


Sodium  137 mmol/L (136-145)   19  09:30    


 


Potassium  3.8 mmol/L (3.5-5.1)   19  09:30    


 


Chloride  102 mmol/L ()   19  09:30    


 


Carbon Dioxide  24 mmol/L (21-32)   19  09:30    


 


Anion Gap  11 MMOL/L (8-16)   19  09:30    


 


BUN  10.5 mg/dL (7-18)   19  09:30    


 


Creatinine  0.8 mg/dL (0.55-1.3)   19  09:30    


 


Est GFR (CKD-EPI)AfAm  120.72   19  09:30    


 


Est GFR (CKD-EPI)NonAf  104.16   19  09:30    


 


Random Glucose  171 mg/dL ()  H  19  09:30    d


 


Calcium  8.4 mg/dL (8.5-10.1)  L  19  09:30    


 


Total Bilirubin  0.7 mg/dL (0.2-1)   19  09:30    


 


AST  27 U/L (15-37)   19  09:30    


 


ALT  22 U/L (13-61)   19  09:30    


 


Alkaline Phosphatase  83 U/L ()   19  09:30    


 


Total Protein  7.2 g/dl (6.4-8.2)   19  09:30    


 


Albumin  3.4 g/dl (3.4-5.0)   19  09:30    


 


RPR Titer  Nonreactive  (NONREACTIVE)   19  09:30    








lab note


Assessment: 





08/15/19 15:50


alcohol withdrawal sx


alert oriented x 3 speech clearly tolerate food and fluid well


Plan: 





continue librium detox regimen

## 2019-08-16 RX ADMIN — Medication SCH MG: at 22:06

## 2019-08-16 RX ADMIN — Medication PRN MG: at 22:06

## 2019-08-16 RX ADMIN — Medication SCH TAB: at 10:15

## 2019-08-16 RX ADMIN — IBUPROFEN PRN MG: 400 TABLET, FILM COATED ORAL at 17:36

## 2019-08-16 RX ADMIN — SERTRALINE HYDROCHLORIDE SCH MG: 50 TABLET ORAL at 10:13

## 2019-08-16 NOTE — PN
S CIWA





- CIWA Score


Nausea/Vomitin-No Nausea/No Vomiting


Muscle Tremors: 2


Anxiety: 4-Mod. Anxious/Guarded


Agitation: 0-Normal Activity


Paroxysmal Sweats: 2


Orientation: 0-Oriented


Tacttile Disturbances: 1-Very Mild Itch/Numbness


Auditory Disturbances: 0-None


Visual Disturbances: 1-Very Mild Sensitivity


Headache: 0-None Present


CIWA-Ar Total Score: 10





BHS Progress Note (SOAP)


Subjective: 





Tremors, Anxious, Sweating.


Objective: 


PATIENT A & O X 3. IN NO ACUTE DISTRESS.





19 15:07


 Vital Signs











Temperature  98.2 F   19 13:18


 


Pulse Rate  87   19 13:18


 


Respiratory Rate  18   19 13:18


 


Blood Pressure  119/80   19 13:18


 


O2 Sat by Pulse Oximetry (%)      








 Laboratory Tests











  19





  09:30 09:30 09:30


 


WBC  7.4  


 


RBC  3.77 L  


 


Hgb  12.5  


 


Hct  36.9  


 


MCV  97.8 H  


 


MCH  33.2  


 


MCHC  34.0  


 


RDW  13.5  D  


 


Plt Count  210  D  


 


MPV  7.6  D  


 


Sodium   137 


 


Potassium   3.8 


 


Chloride   102 


 


Carbon Dioxide   24 


 


Anion Gap   11 


 


BUN   10.5 


 


Creatinine   0.8 


 


Est GFR (CKD-EPI)AfAm   120.72 


 


Est GFR (CKD-EPI)NonAf   104.16 


 


Random Glucose   171 H 


 


Calcium   8.4 L 


 


Total Bilirubin   0.7 


 


AST   27 


 


ALT   22 


 


Alkaline Phosphatase   83 


 


Total Protein   7.2 


 


Albumin   3.4 


 


RPR Titer    Nonreactive








LABS NOTED.


Assessment: 





19 15:07


WITHDRAWAL SYMPTOMS.


HYPERGLYCEMIA.





19 15:08


Plan: 





CONTINUE DETOX.


INCREASE DAILY PO WATER INTAKE.


FASTING GLUCOSE LEVEL ORDERED FOR TOMORROW AM FOR ELEVATED RANDOM GLUCOSE LEVEL 

NOTED ON DETOX ADMISSION LABORATORY ASSESSMENT (PATIENT DENIED KNOWN HISTORY OF 

DIABETES MELLITUS ON DETOX ADMISSION LABORATORY ASSESSMENT).

## 2019-08-17 RX ADMIN — METHOCARBAMOL PRN MG: 500 TABLET ORAL at 13:16

## 2019-08-17 RX ADMIN — Medication SCH TAB: at 10:09

## 2019-08-17 RX ADMIN — METHOCARBAMOL PRN MG: 500 TABLET ORAL at 05:51

## 2019-08-17 RX ADMIN — Medication PRN MG: at 22:08

## 2019-08-17 RX ADMIN — Medication SCH MG: at 22:08

## 2019-08-17 RX ADMIN — SERTRALINE HYDROCHLORIDE SCH MG: 50 TABLET ORAL at 10:08

## 2019-08-17 RX ADMIN — IBUPROFEN PRN MG: 400 TABLET, FILM COATED ORAL at 10:10

## 2019-08-17 NOTE — PN
S CIWA





- CIWA Score


Nausea/Vomitin-No Nausea/No Vomiting


Muscle Tremors: 1-None Visible, but Felt


Anxiety: 3


Agitation: 1-Slight > Activity


Paroxysmal Sweats: 3


Orientation: 0-Oriented


Tacttile Disturbances: 0-None


Auditory Disturbances: 0-None


Visual Disturbances: 2-Mild Sensitivity


Headache: 0-None Present


CIWA-Ar Total Score: 10





BHS Progress Note (SOAP)


Subjective: 





Anxious, Sweating, Body Aches.


Objective: 


PATIENT A & O X 3, OBSERVED AMBULATING ON UNIT UNASSISTED. IN NO ACUTE DISTRESS.





19 14:26


 Vital Signs











Temperature  98 F   19 09:43


 


Pulse Rate  52 L  19 09:43


 


Respiratory Rate  18   19 09:43


 


Blood Pressure  127/60   19 09:43


 


O2 Sat by Pulse Oximetry (%)      








 Laboratory Tests











  19





  09:30 09:30 09:30


 


WBC  7.4  


 


RBC  3.77 L  


 


Hgb  12.5  


 


Hct  36.9  


 


MCV  97.8 H  


 


MCH  33.2  


 


MCHC  34.0  


 


RDW  13.5  D  


 


Plt Count  210  D  


 


MPV  7.6  D  


 


Sodium   137 


 


Potassium   3.8 


 


Chloride   102 


 


Carbon Dioxide   24 


 


Anion Gap   11 


 


BUN   10.5 


 


Creatinine   0.8 


 


Est GFR (CKD-EPI)AfAm   120.72 


 


Est GFR (CKD-EPI)NonAf   104.16 


 


Random Glucose   171 H 


 


Fasting Glucose   


 


Calcium   8.4 L 


 


Total Bilirubin   0.7 


 


AST   27 


 


ALT   22 


 


Alkaline Phosphatase   83 


 


Total Protein   7.2 


 


Albumin   3.4 


 


RPR Titer    Nonreactive














  19





  07:30


 


WBC 


 


RBC 


 


Hgb 


 


Hct 


 


MCV 


 


MCH 


 


MCHC 


 


RDW 


 


Plt Count 


 


MPV 


 


Sodium 


 


Potassium 


 


Chloride 


 


Carbon Dioxide 


 


Anion Gap 


 


BUN 


 


Creatinine 


 


Est GFR (CKD-EPI)AfAm 


 


Est GFR (CKD-EPI)NonAf 


 


Random Glucose 


 


Fasting Glucose  86


 


Calcium 


 


Total Bilirubin 


 


AST 


 


ALT 


 


Alkaline Phosphatase 


 


Total Protein 


 


Albumin 


 


RPR Titer 











LABS NOTED.


RESULT OF FASTING GLUCOSE LEVEL DRAWN EARLIER THIS NOTED (86) - WITHIN NORMAL 

RANGE.





19 14:28


Assessment: 





19 14:27


WITHDRAWAL SYMPTOMS.


Plan: 





CONTINUE DETOX.


INCREASE DAILY PO WATER INTAKE.

## 2019-08-18 RX ADMIN — SERTRALINE HYDROCHLORIDE SCH MG: 50 TABLET ORAL at 10:41

## 2019-08-18 RX ADMIN — Medication SCH MG: at 21:33

## 2019-08-18 RX ADMIN — Medication PRN MG: at 21:34

## 2019-08-18 RX ADMIN — ACETAMINOPHEN PRN MG: 325 TABLET ORAL at 15:03

## 2019-08-18 RX ADMIN — ACETAMINOPHEN PRN MG: 325 TABLET ORAL at 21:33

## 2019-08-18 RX ADMIN — Medication SCH TAB: at 10:41

## 2019-08-18 RX ADMIN — METHOCARBAMOL PRN MG: 500 TABLET ORAL at 15:03

## 2019-08-18 NOTE — PN
[facility] CIWA





- CIWA Score


Nausea/Vomitin-No Nausea/No Vomiting


Muscle Tremors: 2


Anxiety: 1-Mildly Anxious


Agitation: 2


Paroxysmal Sweats: 1-Minimal Palms Moist


Orientation: 0-Oriented


Tacttile Disturbances: 0-None


Auditory Disturbances: 0-None


Visual Disturbances: 0-None


Headache: 0-None Present


CIWA-Ar Total Score: 6





BHS Progress Note (SOAP)


Subjective: 





49 years old male admitted on 19 for acute alcohol withdrawal sx 

management


doing well with valium detox regimen


discuss aftercare with staff prefers [facility] 


less tremor mild restlessness


Objective: 





19 10:04


 Vital Signs











Temperature  97.1 F L  19 09:31


 


Pulse Rate  62   19 09:31


 


Respiratory Rate  20   19 09:31


 


Blood Pressure  102/60   19 09:31


 


O2 Sat by Pulse Oximetry (%)      








 Laboratory Last Values











WBC  7.4 K/mm3 (4.0-10.0)   19  09:30    


 


RBC  3.77 M/mm3 (4.00-5.60)  L  19  09:30    


 


Hgb  12.5 GM/dL (11.7-16.9)   19  09:30    


 


Hct  36.9 % (35.4-49)   19  09:30    


 


MCV  97.8 fl (80-96)  H  19  09:30    


 


MCH  33.2 pg (25.7-33.7)   19  09:30    


 


MCHC  34.0 g/dl (32.0-35.9)   19  09:30    


 


RDW  13.5 % (11.9-15.9)  D 19  09:30    


 


Plt Count  210 K/MM3 (134-434)  D 19  09:30    


 


MPV  7.6 fl (7.5-11.1)  D 19  09:30    


 


Sodium  137 mmol/L (136-145)   19  09:30    


 


Potassium  3.8 mmol/L (3.5-5.1)   19  09:30    


 


Chloride  102 mmol/L ()   19  09:30    


 


Carbon Dioxide  24 mmol/L (21-32)   19  09:30    


 


Anion Gap  11 MMOL/L (8-16)   19  09:30    


 


BUN  10.5 mg/dL (7-18)   19  09:30    


 


Creatinine  0.8 mg/dL (0.55-1.3)   19  09:30    


 


Est GFR (CKD-EPI)AfAm  120.72   19  09:30    


 


Est GFR (CKD-EPI)NonAf  104.16   19  09:30    


 


Random Glucose  171 mg/dL ()  H  19  09:30    


 


Fasting Glucose  86 mg/dL ()   19  07:30    


 


Calcium  8.4 mg/dL (8.5-10.1)  L  19  09:30    


 


Total Bilirubin  0.7 mg/dL (0.2-1)   19  09:30    


 


AST  27 U/L (15-37)   19  09:30    


 


ALT  22 U/L (13-61)   19  09:30    


 


Alkaline Phosphatase  83 U/L ()   19  09:30    


 


Total Protein  7.2 g/dl (6.4-8.2)   19  09:30    


 


Albumin  3.4 g/dl (3.4-5.0)   19  09:30    


 


RPR Titer  Nonreactive  (NONREACTIVE)   19  09:30    








lab noted


Assessment: 





19 10:05


alcohol withdrawal sx


alert oriented x 3 resting on bed sleep better at might

## 2019-08-19 ENCOUNTER — HOSPITAL ENCOUNTER (INPATIENT)
Dept: HOSPITAL 74 - YASAS | Age: 49
LOS: 15 days | Discharge: HOME | DRG: 772 | End: 2019-09-03
Attending: NEUROMUSCULOSKELETAL MEDICINE & OMM | Admitting: NEUROMUSCULOSKELETAL MEDICINE & OMM
Payer: COMMERCIAL

## 2019-08-19 VITALS — DIASTOLIC BLOOD PRESSURE: 67 MMHG | SYSTOLIC BLOOD PRESSURE: 119 MMHG | HEART RATE: 58 BPM

## 2019-08-19 VITALS — TEMPERATURE: 97.8 F

## 2019-08-19 DIAGNOSIS — E78.5: ICD-10-CM

## 2019-08-19 DIAGNOSIS — F10.20: Primary | ICD-10-CM

## 2019-08-19 DIAGNOSIS — M19.90: ICD-10-CM

## 2019-08-19 DIAGNOSIS — M54.5: ICD-10-CM

## 2019-08-19 DIAGNOSIS — Z87.438: ICD-10-CM

## 2019-08-19 DIAGNOSIS — Z86.73: ICD-10-CM

## 2019-08-19 DIAGNOSIS — F17.210: ICD-10-CM

## 2019-08-19 DIAGNOSIS — Z86.69: ICD-10-CM

## 2019-08-19 DIAGNOSIS — G89.29: ICD-10-CM

## 2019-08-19 PROCEDURE — HZ42ZZZ GROUP COUNSELING FOR SUBSTANCE ABUSE TREATMENT, COGNITIVE-BEHAVIORAL: ICD-10-PCS | Performed by: ALLERGY & IMMUNOLOGY

## 2019-08-19 RX ADMIN — HYDROXYZINE HYDROCHLORIDE PRN MG: 25 TABLET, FILM COATED ORAL at 16:41

## 2019-08-19 RX ADMIN — Medication PRN MG: at 21:15

## 2019-08-19 RX ADMIN — Medication SCH MG: at 21:15

## 2019-08-19 RX ADMIN — Medication SCH: at 10:30

## 2019-08-19 RX ADMIN — SERTRALINE HYDROCHLORIDE SCH: 50 TABLET ORAL at 10:30

## 2019-08-19 RX ADMIN — METHOCARBAMOL PRN MG: 500 TABLET ORAL at 15:10

## 2019-08-19 NOTE — HP
BHS MD Rehab Assess/Revision





- Admission History


Admitted to Rehab from: [facility]


Date of Admission to Rehab: 08/19/19





- Findings


Detox History & Physical reviewed: Yes


Concur with findings: Yes


Comments/Additional Findings: transferred from detox to rehab admission as per 

protocol





Inpatient Rehab Admission





- Rehab Decision to Admit


Inpatient rehab admission?: Yes





- Initial Determination


Are CD services needed?: Yes


Free of communicable disease: Yes


Not in need of hospitalization: Yes





- Rehab Admission Criteria


Previous failed treatment: Yes


Poor recovery environment: Yes


Comorbidities: Yes


Lacks judgement: No


Patient is meeting Inpatient Rehab admission criteria:: Yes

## 2019-08-19 NOTE — DS
BHS Detox Discharge Summary


Admission Date: 


08/14/19





Discharge Date: 08/19/19





- History


Present History: Alcohol Dependence


Additional Comments: 





49 years old male admitted on 08/14/19 for acute alcohol withdrawal sx 

management


doing well with valium detox regimen


no complication through out the detox stay


 seem by psychiatrist begin seroquel and zoloft





- Physical Exam Results


Vital Signs: 


 Vital Signs











Temperature  97.8 F   08/19/19 13:22


 


Pulse Rate  60   08/19/19 13:22


 


Respiratory Rate  18   08/19/19 13:22


 


Blood Pressure  113/71   08/19/19 13:22


 


O2 Sat by Pulse Oximetry (%)      











Pertinent Admission Physical Exam Findings: 





alcohol withdrawal sx


 Laboratory Last Values











WBC  7.4 K/mm3 (4.0-10.0)   08/14/19  09:30    


 


RBC  3.77 M/mm3 (4.00-5.60)  L  08/14/19  09:30    


 


Hgb  12.5 GM/dL (11.7-16.9)   08/14/19  09:30    


 


Hct  36.9 % (35.4-49)   08/14/19  09:30    


 


MCV  97.8 fl (80-96)  H  08/14/19  09:30    


 


MCH  33.2 pg (25.7-33.7)   08/14/19  09:30    


 


MCHC  34.0 g/dl (32.0-35.9)   08/14/19  09:30    


 


RDW  13.5 % (11.9-15.9)  D 08/14/19  09:30    


 


Plt Count  210 K/MM3 (134-434)  D 08/14/19  09:30    


 


MPV  7.6 fl (7.5-11.1)  D 08/14/19  09:30    


 


Sodium  137 mmol/L (136-145)   08/14/19  09:30    


 


Potassium  3.8 mmol/L (3.5-5.1)   08/14/19  09:30    


 


Chloride  102 mmol/L ()   08/14/19  09:30    


 


Carbon Dioxide  24 mmol/L (21-32)   08/14/19  09:30    


 


Anion Gap  11 MMOL/L (8-16)   08/14/19  09:30    


 


BUN  10.5 mg/dL (7-18)   08/14/19  09:30    


 


Creatinine  0.8 mg/dL (0.55-1.3)   08/14/19  09:30    


 


Est GFR (CKD-EPI)AfAm  120.72   08/14/19  09:30    


 


Est GFR (CKD-EPI)NonAf  104.16   08/14/19  09:30    


 


Random Glucose  171 mg/dL ()  H  08/14/19  09:30    


 


Fasting Glucose  86 mg/dL ()   08/17/19  07:30    


 


Calcium  8.4 mg/dL (8.5-10.1)  L  08/14/19  09:30    


 


Total Bilirubin  0.7 mg/dL (0.2-1)   08/14/19  09:30    


 


AST  27 U/L (15-37)   08/14/19  09:30    


 


ALT  22 U/L (13-61)   08/14/19  09:30    


 


Alkaline Phosphatase  83 U/L ()   08/14/19  09:30    


 


Total Protein  7.2 g/dl (6.4-8.2)   08/14/19  09:30    


 


Albumin  3.4 g/dl (3.4-5.0)   08/14/19  09:30    


 


RPR Titer  Nonreactive  (NONREACTIVE)   08/14/19  09:30    








lab noted


alert oriented x 3 S1S2 Regular clear lung bilaterally 


alert oriented x 3 steady speech clearlt








- Treatment


Hospital Course: Detox Protocol Followed, Detoxed Safely, Responded well (rev), 

Discharged Condition Good, Rehab Referral Accepted


Patient has Accepted a [facility]hab Referral to: [facility]





- Medication


Discharge Medications: 


Ambulatory Orders





Quetiapine Fumarate [Seroquel -] 200 mg PO HS 02/21/18 


Sertraline HCl [Zoloft -] 100 mg PO DAILY 02/21/18 











- Diagnosis


(1) Alcohol dependence with uncomplicated withdrawal


Current Visit: Yes   Status: Acute   





(2) Hyperlipidemia


Current Visit: Yes   Status: Chronic   


Qualifiers: 


   Hyperlipidemia type: pure hypercholesterolemia   Qualified Code(s): E78.00 - 

Pure hypercholesterolemia, unspecified; E78.0 - Pure hypercholesterolemia   





(3) Nicotine dependence


Current Visit: Yes   Status: Acute   


Qualifiers: 


   Nicotine product type: cigarettes   Substance use status: in withdrawal   

Qualified Code(s): F17.213 - Nicotine dependence, cigarettes, with withdrawal   





(4) Substance induced mood disorder


Current Visit: Yes   Status: Suspected   





(5) Positive PPD, treated


Current Visit: Yes   Status: Suspected   





- AMA


Did Patient Leave Against Medical Advice: No

## 2019-08-20 RX ADMIN — METHOCARBAMOL PRN MG: 500 TABLET ORAL at 17:43

## 2019-08-20 RX ADMIN — Medication SCH TAB: at 10:11

## 2019-08-20 RX ADMIN — IBUPROFEN PRN MG: 400 TABLET, FILM COATED ORAL at 17:43

## 2019-08-20 RX ADMIN — Medication SCH MG: at 21:10

## 2019-08-20 RX ADMIN — SERTRALINE HYDROCHLORIDE SCH MG: 50 TABLET ORAL at 10:12

## 2019-08-20 RX ADMIN — Medication PRN MG: at 21:10

## 2019-08-20 NOTE — PN
BHS Progress Note


Note: 





pT IS A NEW ADMIT TO REHAB  FROM [address] [place] [address] ON 8/19/19. PT IS C/O RIGHT 

SHOULDER AND HIP/SIDE PAIN X 2 MONTHS. PT AMBULATES  WITH NO DIFFICULTY.


 Vital Signs - 24 hr











  08/19/19 08/20/19 08/20/19





  20:53 01:58 03:30


 


Temperature 98.3 F  


 


Pulse Rate 68  


 


Respiratory 20 18 18





Rate   


 


Blood Pressure 128/80  














  08/20/19





  06:32


 


Temperature 98 F


 


Pulse Rate 70


 


Respiratory 18





Rate 


 


Blood Pressure 95/60








LIMITED P/E:


EXTREMITIES:ACTIVE ROM ALL JOINTS. 


NO DEFICIT NOTED.





A/P:


SHOULDER/BACK PAIN


MOTRIN PRN


ROBAXIN 500 MG PO TID PRN


INCREASE PO FLUIDS.

## 2019-08-21 RX ADMIN — Medication SCH TAB: at 09:56

## 2019-08-21 RX ADMIN — Medication SCH MG: at 21:08

## 2019-08-21 RX ADMIN — SERTRALINE HYDROCHLORIDE SCH MG: 50 TABLET ORAL at 09:56

## 2019-08-21 RX ADMIN — Medication PRN MG: at 21:08

## 2019-08-21 RX ADMIN — METHOCARBAMOL PRN MG: 500 TABLET ORAL at 09:56

## 2019-08-21 RX ADMIN — IBUPROFEN PRN MG: 400 TABLET, FILM COATED ORAL at 09:55

## 2019-08-22 RX ADMIN — Medication SCH TAB: at 10:02

## 2019-08-22 RX ADMIN — Medication PRN MG: at 21:14

## 2019-08-22 RX ADMIN — Medication SCH MG: at 21:14

## 2019-08-22 RX ADMIN — METHOCARBAMOL PRN MG: 500 TABLET ORAL at 18:57

## 2019-08-22 RX ADMIN — IBUPROFEN PRN MG: 400 TABLET, FILM COATED ORAL at 18:56

## 2019-08-22 RX ADMIN — SERTRALINE HYDROCHLORIDE SCH MG: 50 TABLET ORAL at 10:02

## 2019-08-23 RX ADMIN — Medication SCH TAB: at 10:05

## 2019-08-23 RX ADMIN — Medication SCH MG: at 21:12

## 2019-08-23 RX ADMIN — SERTRALINE HYDROCHLORIDE SCH MG: 50 TABLET ORAL at 10:05

## 2019-08-23 RX ADMIN — Medication PRN MG: at 21:14

## 2019-08-24 RX ADMIN — Medication PRN MG: at 21:45

## 2019-08-24 RX ADMIN — SERTRALINE HYDROCHLORIDE SCH MG: 50 TABLET ORAL at 09:50

## 2019-08-24 RX ADMIN — METHOCARBAMOL PRN MG: 500 TABLET ORAL at 17:32

## 2019-08-24 RX ADMIN — Medication SCH MG: at 21:45

## 2019-08-24 RX ADMIN — Medication SCH TAB: at 09:50

## 2019-08-24 RX ADMIN — IBUPROFEN PRN MG: 400 TABLET, FILM COATED ORAL at 17:32

## 2019-08-25 RX ADMIN — SERTRALINE HYDROCHLORIDE SCH MG: 50 TABLET ORAL at 09:43

## 2019-08-25 RX ADMIN — Medication SCH TAB: at 09:43

## 2019-08-25 RX ADMIN — Medication PRN MG: at 21:08

## 2019-08-25 RX ADMIN — IBUPROFEN PRN MG: 400 TABLET, FILM COATED ORAL at 13:32

## 2019-08-25 RX ADMIN — Medication SCH MG: at 21:07

## 2019-08-25 RX ADMIN — METHOCARBAMOL PRN MG: 500 TABLET ORAL at 13:32

## 2019-08-26 RX ADMIN — Medication PRN MG: at 21:00

## 2019-08-26 RX ADMIN — IBUPROFEN PRN MG: 400 TABLET, FILM COATED ORAL at 11:47

## 2019-08-26 RX ADMIN — Medication SCH MG: at 21:00

## 2019-08-26 RX ADMIN — SERTRALINE HYDROCHLORIDE SCH MG: 50 TABLET ORAL at 09:44

## 2019-08-26 RX ADMIN — Medication SCH TAB: at 09:44

## 2019-08-26 RX ADMIN — METHOCARBAMOL PRN MG: 500 TABLET ORAL at 12:45

## 2019-08-27 RX ADMIN — IBUPROFEN PRN MG: 400 TABLET, FILM COATED ORAL at 19:09

## 2019-08-27 RX ADMIN — Medication SCH TAB: at 09:57

## 2019-08-27 RX ADMIN — SERTRALINE HYDROCHLORIDE SCH MG: 50 TABLET ORAL at 09:58

## 2019-08-27 RX ADMIN — Medication PRN MG: at 21:01

## 2019-08-27 RX ADMIN — METHOCARBAMOL PRN MG: 500 TABLET ORAL at 09:58

## 2019-08-27 RX ADMIN — Medication SCH MG: at 21:01

## 2019-08-27 RX ADMIN — IBUPROFEN PRN MG: 400 TABLET, FILM COATED ORAL at 09:57

## 2019-08-27 RX ADMIN — METHOCARBAMOL PRN MG: 500 TABLET ORAL at 19:09

## 2019-08-28 RX ADMIN — METHOCARBAMOL PRN MG: 500 TABLET ORAL at 18:09

## 2019-08-28 RX ADMIN — IBUPROFEN PRN MG: 400 TABLET, FILM COATED ORAL at 09:59

## 2019-08-28 RX ADMIN — METHOCARBAMOL PRN MG: 500 TABLET ORAL at 09:45

## 2019-08-28 RX ADMIN — Medication SCH TAB: at 09:45

## 2019-08-28 RX ADMIN — SERTRALINE HYDROCHLORIDE SCH MG: 50 TABLET ORAL at 09:45

## 2019-08-28 RX ADMIN — Medication SCH MG: at 21:12

## 2019-08-28 RX ADMIN — IBUPROFEN PRN MG: 400 TABLET, FILM COATED ORAL at 18:09

## 2019-08-28 RX ADMIN — Medication PRN MG: at 21:12

## 2019-08-29 RX ADMIN — METHOCARBAMOL PRN MG: 500 TABLET ORAL at 07:16

## 2019-08-29 RX ADMIN — IBUPROFEN PRN MG: 400 TABLET, FILM COATED ORAL at 07:17

## 2019-08-29 RX ADMIN — Medication SCH TAB: at 09:55

## 2019-08-29 RX ADMIN — SERTRALINE HYDROCHLORIDE SCH MG: 50 TABLET ORAL at 09:55

## 2019-08-29 RX ADMIN — Medication SCH MG: at 21:08

## 2019-08-29 RX ADMIN — Medication PRN MG: at 21:08

## 2019-08-30 RX ADMIN — METHOCARBAMOL PRN MG: 500 TABLET ORAL at 18:07

## 2019-08-30 RX ADMIN — Medication PRN MG: at 21:06

## 2019-08-30 RX ADMIN — IBUPROFEN PRN MG: 400 TABLET, FILM COATED ORAL at 18:05

## 2019-08-30 RX ADMIN — Medication SCH TAB: at 10:02

## 2019-08-30 RX ADMIN — METHOCARBAMOL PRN MG: 500 TABLET ORAL at 08:34

## 2019-08-30 RX ADMIN — IBUPROFEN PRN MG: 400 TABLET, FILM COATED ORAL at 08:33

## 2019-08-30 RX ADMIN — Medication SCH MG: at 21:06

## 2019-08-30 RX ADMIN — SERTRALINE HYDROCHLORIDE SCH MG: 50 TABLET ORAL at 10:02

## 2019-08-30 NOTE — PN
BHS Progress Note


Note: 





Patient is discharged today. Scripts for30 days of medications(Zoloft 100 mg/day

, Seroquel 200 mg/hs) are electronically transmitted to [facility] at [phone] [place] [address]

## 2019-08-31 RX ADMIN — Medication PRN MG: at 21:09

## 2019-08-31 RX ADMIN — METHOCARBAMOL PRN MG: 500 TABLET ORAL at 09:41

## 2019-08-31 RX ADMIN — Medication SCH MG: at 21:09

## 2019-08-31 RX ADMIN — Medication SCH TAB: at 09:41

## 2019-08-31 RX ADMIN — IBUPROFEN PRN MG: 400 TABLET, FILM COATED ORAL at 13:01

## 2019-08-31 RX ADMIN — SERTRALINE HYDROCHLORIDE SCH MG: 50 TABLET ORAL at 09:41

## 2019-09-01 RX ADMIN — METHOCARBAMOL PRN MG: 500 TABLET ORAL at 20:10

## 2019-09-01 RX ADMIN — Medication PRN MG: at 21:12

## 2019-09-01 RX ADMIN — IBUPROFEN PRN MG: 400 TABLET, FILM COATED ORAL at 20:10

## 2019-09-01 RX ADMIN — METHOCARBAMOL PRN MG: 500 TABLET ORAL at 08:10

## 2019-09-01 RX ADMIN — IBUPROFEN PRN MG: 400 TABLET, FILM COATED ORAL at 08:09

## 2019-09-01 RX ADMIN — SERTRALINE HYDROCHLORIDE SCH MG: 50 TABLET ORAL at 10:01

## 2019-09-01 RX ADMIN — Medication SCH TAB: at 10:01

## 2019-09-01 RX ADMIN — Medication SCH MG: at 21:12

## 2019-09-02 RX ADMIN — SERTRALINE HYDROCHLORIDE SCH MG: 50 TABLET ORAL at 10:06

## 2019-09-02 RX ADMIN — Medication PRN MG: at 21:15

## 2019-09-02 RX ADMIN — Medication SCH TAB: at 10:06

## 2019-09-02 RX ADMIN — Medication SCH MG: at 21:15

## 2019-09-02 RX ADMIN — METHOCARBAMOL PRN MG: 500 TABLET ORAL at 10:06

## 2019-09-02 RX ADMIN — IBUPROFEN PRN MG: 400 TABLET, FILM COATED ORAL at 10:06

## 2019-09-02 NOTE — DS
[facility] Rehab Discharge Summary





- [facility] Rehab Discharge Summary


Admission Date: 08/19/19


Discharge Date: 09/03/19





- History


Present History: Alcohol dependence


Additional Comments: 





Pt is a 48 y/o male with a hx of alcohol dependence admitted to rehab. Pt 

completed program, scheduled for discharge on 9/3/19  and participated actively 

in treatment. Pt agreed to follow up with his primary care provider, Dr. Jony Rodriguez and Bob for Mental health mangement.


Pertinent Past History: 





Arthritis


Depression





- Discharge Physical Exam


Vital Signs: 


General:Alert o x 3.


Heent:Normocephalic,arvin,eomi,hearing grossly normal


Cardiac:s1 s2, rrr


Lungs:cta,kimberly.


Abdomen:soft,nt,nd,+bs


Extremities:no edema,cyanosis, Full ROM all limbs.





                                                                               

        


                                                                               

                     Vital Signs











Temperature  98.0 F   09/02/19 07:06


 


Pulse Rate  73   09/02/19 07:06


 


Respiratory Rate  18   09/02/19 07:06


 


Blood Pressure  113/70   09/02/19 07:06


 


O2 Sat by Pulse Oximetry (%)      











Pertinent Admission Physical Exam Findings: 





unremakable





- Treatment


Discharge Condition: Discharge condition good


Hospital Course: 





Rehabilitated safely, tolerated treatment well and accepted aftercare referral





- Medication


Discharge Medications: 


Ambulatory Orders





Quetiapine Fumarate [Seroquel -] 200 mg PO HS #30 tablet 08/30/19 


Sertraline HCl [Zoloft] 100 mg PO DAILY #30 tablet 08/30/19 











- Medication-Assisted Treatment (MAT)


Medication-Assisted Treatment (MAT): No





- Discharge Instructions


Diet, activity, other medical instructions: 





Diet:Regular





Activity: OOB,Ad Renetta





Other medical instructions:Follow up with primary care with Dr. Jony Rodriguez 

at [address] [place] [address] within 1 week after discharge.


Follow up with Dr. Mcbride for Mental health at [facility] on [place], [place]





Referred to [facility]  on [address]  [place], [place] for CD aftercare.








- Diagnosis


(1) Alcohol dependence


Status: Chronic   


Qualifiers: 


   Substance use status: uncomplicated   Qualified Code(s): F10.20 - Alcohol 

dependence, uncomplicated   





(2) Nicotine dependence


Status: Chronic   


Qualifiers: 


   Nicotine product type: cigarettes   Substance use status: uncomplicated   

Qualified Code(s): F17.210 - Nicotine dependence, cigarettes, uncomplicated   





(3) Arthritis


Status: Chronic   





(4) Chronic back pain


Status: Chronic   


Qualifiers: 


   Back pain location: low back pain   Back pain laterality: unspecified 





(5) Hyperlipidemia


Status: Chronic   


Qualifiers: 


   Hyperlipidemia type: pure hypercholesterolemia   Qualified Code(s): E78.00 - 

Pure hypercholesterolemia, unspecified; E78.0 - Pure hypercholesterolemia   





(6) Old cerebrovascular accident (CVA) without late effect


Status: Chronic   





(7) Alcohol related seizure


Status: Suspected   





(8) History of syphilis


Status: Resolved   





- Follow-up Referral


Minutes to complete discharge: 25





- AMA


Did Patient Leave Against Medical Advice: No

## 2019-09-03 VITALS — TEMPERATURE: 97.7 F | SYSTOLIC BLOOD PRESSURE: 125 MMHG | HEART RATE: 66 BPM | DIASTOLIC BLOOD PRESSURE: 78 MMHG

## 2019-09-03 RX ADMIN — IBUPROFEN PRN MG: 400 TABLET, FILM COATED ORAL at 09:18

## 2019-09-03 RX ADMIN — Medication SCH TAB: at 09:17

## 2019-09-03 RX ADMIN — METHOCARBAMOL PRN MG: 500 TABLET ORAL at 09:18

## 2019-09-03 RX ADMIN — SERTRALINE HYDROCHLORIDE SCH MG: 50 TABLET ORAL at 09:17

## 2020-12-19 ENCOUNTER — HOSPITAL ENCOUNTER (INPATIENT)
Dept: HOSPITAL 74 - YASAS | Age: 50
LOS: 5 days | Discharge: OTHER INSTITUTION | End: 2020-12-24
Attending: ALLERGY & IMMUNOLOGY | Admitting: ALLERGY & IMMUNOLOGY
Payer: COMMERCIAL

## 2020-12-19 VITALS — BODY MASS INDEX: 26.2 KG/M2

## 2020-12-19 DIAGNOSIS — F10.230: Primary | ICD-10-CM

## 2020-12-19 DIAGNOSIS — N39.0: ICD-10-CM

## 2020-12-19 DIAGNOSIS — J31.0: ICD-10-CM

## 2020-12-19 DIAGNOSIS — F10.220: ICD-10-CM

## 2020-12-19 DIAGNOSIS — F31.9: ICD-10-CM

## 2020-12-19 DIAGNOSIS — F51.05: ICD-10-CM

## 2020-12-19 DIAGNOSIS — G47.00: ICD-10-CM

## 2020-12-19 DIAGNOSIS — Z86.11: ICD-10-CM

## 2020-12-19 DIAGNOSIS — G89.29: ICD-10-CM

## 2020-12-19 DIAGNOSIS — F17.210: ICD-10-CM

## 2020-12-19 DIAGNOSIS — R74.01: ICD-10-CM

## 2020-12-19 DIAGNOSIS — R73.03: ICD-10-CM

## 2020-12-19 DIAGNOSIS — Z87.438: ICD-10-CM

## 2020-12-19 DIAGNOSIS — E83.51: ICD-10-CM

## 2020-12-19 DIAGNOSIS — Z86.73: ICD-10-CM

## 2020-12-19 DIAGNOSIS — Z56.0: ICD-10-CM

## 2020-12-19 DIAGNOSIS — D69.6: ICD-10-CM

## 2020-12-19 DIAGNOSIS — E88.09: ICD-10-CM

## 2020-12-19 DIAGNOSIS — E78.5: ICD-10-CM

## 2020-12-19 DIAGNOSIS — M19.90: ICD-10-CM

## 2020-12-19 DIAGNOSIS — F19.24: ICD-10-CM

## 2020-12-19 DIAGNOSIS — M54.5: ICD-10-CM

## 2020-12-19 DIAGNOSIS — F19.282: ICD-10-CM

## 2020-12-19 DIAGNOSIS — Z86.69: ICD-10-CM

## 2020-12-19 PROCEDURE — C9803 HOPD COVID-19 SPEC COLLECT: HCPCS

## 2020-12-19 PROCEDURE — U0003 INFECTIOUS AGENT DETECTION BY NUCLEIC ACID (DNA OR RNA); SEVERE ACUTE RESPIRATORY SYNDROME CORONAVIRUS 2 (SARS-COV-2) (CORONAVIRUS DISEASE [COVID-19]), AMPLIFIED PROBE TECHNIQUE, MAKING USE OF HIGH THROUGHPUT TECHNOLOGIES AS DESCRIBED BY CMS-2020-01-R: HCPCS

## 2020-12-19 PROCEDURE — HZ2ZZZZ DETOXIFICATION SERVICES FOR SUBSTANCE ABUSE TREATMENT: ICD-10-PCS | Performed by: ALLERGY & IMMUNOLOGY

## 2020-12-19 RX ADMIN — METHOCARBAMOL PRN MG: 500 TABLET ORAL at 14:23

## 2020-12-19 RX ADMIN — IBUPROFEN PRN MG: 400 TABLET, FILM COATED ORAL at 14:22

## 2020-12-19 RX ADMIN — HYDROXYZINE PAMOATE SCH: 25 CAPSULE ORAL at 14:26

## 2020-12-19 RX ADMIN — Medication SCH MG: at 22:03

## 2020-12-19 RX ADMIN — HYDROXYZINE PAMOATE SCH MG: 25 CAPSULE ORAL at 22:03

## 2020-12-19 RX ADMIN — HYDROXYZINE PAMOATE SCH MG: 25 CAPSULE ORAL at 17:02

## 2020-12-19 SDOH — ECONOMIC STABILITY - INCOME SECURITY: UNEMPLOYMENT, UNSPECIFIED: Z56.0

## 2020-12-20 LAB
ALBUMIN SERPL-MCNC: 2.9 G/DL (ref 3.4–5)
ALP SERPL-CCNC: 97 U/L (ref 45–117)
ALT SERPL-CCNC: 48 U/L (ref 13–61)
ANION GAP SERPL CALC-SCNC: 9 MMOL/L (ref 8–16)
AST SERPL-CCNC: 49 U/L (ref 15–37)
BILIRUB SERPL-MCNC: 1.1 MG/DL (ref 0.2–1)
BUN SERPL-MCNC: 11.4 MG/DL (ref 7–18)
CALCIUM SERPL-MCNC: 8 MG/DL (ref 8.5–10.1)
CHLORIDE SERPL-SCNC: 100 MMOL/L (ref 98–107)
CO2 SERPL-SCNC: 28 MMOL/L (ref 21–32)
CREAT SERPL-MCNC: 0.8 MG/DL (ref 0.55–1.3)
DEPRECATED RDW RBC AUTO: 16.3 % (ref 11.9–15.9)
GLUCOSE SERPL-MCNC: 144 MG/DL (ref 74–106)
HCT VFR BLD CALC: 36.3 % (ref 35.4–49)
HGB BLD-MCNC: 12.1 GM/DL (ref 11.7–16.9)
MCH RBC QN AUTO: 32.2 PG (ref 25.7–33.7)
MCHC RBC AUTO-ENTMCNC: 33.3 G/DL (ref 32–35.9)
MCV RBC: 96.6 FL (ref 80–96)
PLATELET # BLD AUTO: 104 K/MM3 (ref 134–434)
PMV BLD: 8.3 FL (ref 7.5–11.1)
POTASSIUM SERPLBLD-SCNC: 3.3 MMOL/L (ref 3.5–5.1)
PROT SERPL-MCNC: 6.4 G/DL (ref 6.4–8.2)
RBC # BLD AUTO: 3.76 M/MM3 (ref 4–5.6)
SODIUM SERPL-SCNC: 136 MMOL/L (ref 136–145)
WBC # BLD AUTO: 7.4 K/MM3 (ref 4–10)

## 2020-12-20 RX ADMIN — IBUPROFEN PRN MG: 400 TABLET, FILM COATED ORAL at 19:24

## 2020-12-20 RX ADMIN — Medication SCH TAB: at 10:04

## 2020-12-20 RX ADMIN — Medication SCH MG: at 22:03

## 2020-12-20 RX ADMIN — HYDROXYZINE PAMOATE SCH MG: 25 CAPSULE ORAL at 17:00

## 2020-12-20 RX ADMIN — QUETIAPINE FUMARATE SCH MG: 100 TABLET ORAL at 22:03

## 2020-12-20 RX ADMIN — HYDROXYZINE PAMOATE SCH: 25 CAPSULE ORAL at 22:04

## 2020-12-20 RX ADMIN — NICOTINE SCH MG: 21 PATCH TRANSDERMAL at 10:04

## 2020-12-20 RX ADMIN — METHOCARBAMOL PRN MG: 500 TABLET ORAL at 13:45

## 2020-12-20 RX ADMIN — ACETAMINOPHEN PRN MG: 325 TABLET ORAL at 08:39

## 2020-12-20 RX ADMIN — Medication SCH: at 23:55

## 2020-12-20 RX ADMIN — Medication SCH: at 22:03

## 2020-12-20 RX ADMIN — HYDROXYZINE PAMOATE SCH MG: 25 CAPSULE ORAL at 10:04

## 2020-12-20 RX ADMIN — HYDROXYZINE PAMOATE SCH MG: 25 CAPSULE ORAL at 13:45

## 2020-12-20 RX ADMIN — HYDROXYZINE PAMOATE SCH MG: 25 CAPSULE ORAL at 05:13

## 2020-12-21 LAB
DEPRECATED RDW RBC AUTO: 16.3 % (ref 11.9–15.9)
HCT VFR BLD CALC: 34.6 % (ref 35.4–49)
HGB BLD-MCNC: 11.5 GM/DL (ref 11.7–16.9)
MCH RBC QN AUTO: 32.2 PG (ref 25.7–33.7)
MCHC RBC AUTO-ENTMCNC: 33.1 G/DL (ref 32–35.9)
MCV RBC: 97.3 FL (ref 80–96)
PLATELET # BLD AUTO: 83 K/MM3 (ref 134–434)
PMV BLD: 8.8 FL (ref 7.5–11.1)
POTASSIUM SERPLBLD-SCNC: 3.9 MMOL/L (ref 3.5–5.1)
RBC # BLD AUTO: 3.56 M/MM3 (ref 4–5.6)
WBC # BLD AUTO: 8.3 K/MM3 (ref 4–10)

## 2020-12-21 RX ADMIN — ACETAMINOPHEN PRN MG: 325 TABLET ORAL at 09:43

## 2020-12-21 RX ADMIN — SERTRALINE HYDROCHLORIDE SCH MG: 50 TABLET ORAL at 09:43

## 2020-12-21 RX ADMIN — HYDROXYZINE PAMOATE SCH: 25 CAPSULE ORAL at 22:28

## 2020-12-21 RX ADMIN — FLUTICASONE PROPIONATE SCH DROP: 50 SPRAY, METERED NASAL at 12:43

## 2020-12-21 RX ADMIN — FLUTICASONE PROPIONATE SCH DROP: 50 SPRAY, METERED NASAL at 22:27

## 2020-12-21 RX ADMIN — Medication SCH MG: at 22:27

## 2020-12-21 RX ADMIN — METHOCARBAMOL PRN MG: 500 TABLET ORAL at 04:49

## 2020-12-21 RX ADMIN — AMOXICILLIN SCH MG: 500 CAPSULE ORAL at 13:57

## 2020-12-21 RX ADMIN — ACETAMINOPHEN PRN MG: 325 TABLET ORAL at 22:26

## 2020-12-21 RX ADMIN — HYDROXYZINE PAMOATE SCH MG: 25 CAPSULE ORAL at 06:32

## 2020-12-21 RX ADMIN — Medication SCH TAB: at 09:46

## 2020-12-21 RX ADMIN — HYDROXYZINE PAMOATE SCH: 25 CAPSULE ORAL at 17:55

## 2020-12-21 RX ADMIN — HYDROXYZINE PAMOATE SCH MG: 25 CAPSULE ORAL at 13:57

## 2020-12-21 RX ADMIN — QUETIAPINE FUMARATE SCH MG: 100 TABLET ORAL at 22:27

## 2020-12-21 RX ADMIN — AMOXICILLIN SCH MG: 500 CAPSULE ORAL at 22:27

## 2020-12-21 RX ADMIN — Medication SCH MG: at 09:46

## 2020-12-21 RX ADMIN — NICOTINE SCH: 21 PATCH TRANSDERMAL at 09:44

## 2020-12-21 RX ADMIN — Medication SCH: at 22:27

## 2020-12-21 RX ADMIN — HYDROXYZINE PAMOATE SCH MG: 25 CAPSULE ORAL at 09:44

## 2020-12-22 RX ADMIN — AMOXICILLIN SCH MG: 500 CAPSULE ORAL at 05:14

## 2020-12-22 RX ADMIN — HYDROXYZINE PAMOATE SCH: 25 CAPSULE ORAL at 18:16

## 2020-12-22 RX ADMIN — Medication SCH: at 22:12

## 2020-12-22 RX ADMIN — HYDROXYZINE PAMOATE SCH: 25 CAPSULE ORAL at 22:12

## 2020-12-22 RX ADMIN — Medication SCH: at 22:13

## 2020-12-22 RX ADMIN — NICOTINE SCH: 21 PATCH TRANSDERMAL at 11:04

## 2020-12-22 RX ADMIN — AMOXICILLIN SCH MG: 500 CAPSULE ORAL at 13:37

## 2020-12-22 RX ADMIN — QUETIAPINE FUMARATE SCH MG: 100 TABLET ORAL at 22:11

## 2020-12-22 RX ADMIN — HYDROXYZINE PAMOATE SCH MG: 25 CAPSULE ORAL at 11:03

## 2020-12-22 RX ADMIN — FLUTICASONE PROPIONATE SCH: 50 SPRAY, METERED NASAL at 22:13

## 2020-12-22 RX ADMIN — Medication SCH TAB: at 11:03

## 2020-12-22 RX ADMIN — HYDROXYZINE PAMOATE SCH MG: 25 CAPSULE ORAL at 05:14

## 2020-12-22 RX ADMIN — AMOXICILLIN SCH: 500 CAPSULE ORAL at 22:13

## 2020-12-22 RX ADMIN — FLUTICASONE PROPIONATE SCH DROP: 50 SPRAY, METERED NASAL at 11:04

## 2020-12-22 RX ADMIN — HYDROXYZINE PAMOATE SCH: 25 CAPSULE ORAL at 13:38

## 2020-12-22 RX ADMIN — SERTRALINE HYDROCHLORIDE SCH MG: 50 TABLET ORAL at 11:04

## 2020-12-22 RX ADMIN — Medication SCH MG: at 11:05

## 2020-12-23 LAB
APPEARANCE UR: CLEAR
BACTERIA # UR AUTO: 160 /UL (ref 0–1359)
BILIRUB UR STRIP.AUTO-MCNC: NEGATIVE MG/DL
CASTS URNS QL MICRO: 1 /UL (ref 0–3.1)
COLOR UR: YELLOW
EPITH CASTS URNS QL MICRO: 7 /UL (ref 0–25.1)
KETONES UR QL STRIP: NEGATIVE
LEUKOCYTE ESTERASE UR QL STRIP.AUTO: (no result)
NITRITE UR QL STRIP: NEGATIVE
PH UR: 6 [PH] (ref 5–8)
PROT UR QL STRIP: NEGATIVE
PROT UR QL STRIP: NEGATIVE
RBC # BLD AUTO: 20 /UL (ref 0–23.9)
SP GR UR: 1.01 (ref 1.01–1.03)
UROBILINOGEN UR STRIP-MCNC: 1 MG/DL (ref 0.2–1)
WBC # UR AUTO: 201 /UL (ref 0–25.8)

## 2020-12-23 RX ADMIN — HYDROXYZINE PAMOATE SCH MG: 25 CAPSULE ORAL at 17:52

## 2020-12-23 RX ADMIN — SERTRALINE HYDROCHLORIDE SCH MG: 50 TABLET ORAL at 10:24

## 2020-12-23 RX ADMIN — Medication SCH MG: at 10:24

## 2020-12-23 RX ADMIN — AMOXICILLIN SCH MG: 500 CAPSULE ORAL at 05:12

## 2020-12-23 RX ADMIN — FLUTICASONE PROPIONATE SCH SPRAY: 50 SPRAY, METERED NASAL at 21:32

## 2020-12-23 RX ADMIN — FLUTICASONE PROPIONATE SCH SPRAY: 50 SPRAY, METERED NASAL at 10:24

## 2020-12-23 RX ADMIN — HYDROXYZINE PAMOATE SCH: 25 CAPSULE ORAL at 10:28

## 2020-12-23 RX ADMIN — ACETAMINOPHEN PRN MG: 325 TABLET ORAL at 12:27

## 2020-12-23 RX ADMIN — HYDROXYZINE PAMOATE SCH MG: 25 CAPSULE ORAL at 05:13

## 2020-12-23 RX ADMIN — Medication SCH: at 22:02

## 2020-12-23 RX ADMIN — IBUPROFEN PRN MG: 400 TABLET, FILM COATED ORAL at 17:51

## 2020-12-23 RX ADMIN — HYDROXYZINE PAMOATE SCH: 25 CAPSULE ORAL at 14:38

## 2020-12-23 RX ADMIN — AMOXICILLIN SCH MG: 500 CAPSULE ORAL at 14:37

## 2020-12-23 RX ADMIN — Medication SCH TAB: at 10:27

## 2020-12-23 RX ADMIN — Medication SCH: at 22:01

## 2020-12-23 RX ADMIN — HYDROXYZINE PAMOATE SCH: 25 CAPSULE ORAL at 22:02

## 2020-12-23 RX ADMIN — AMOXICILLIN SCH: 500 CAPSULE ORAL at 22:02

## 2020-12-23 RX ADMIN — QUETIAPINE FUMARATE SCH MG: 100 TABLET ORAL at 22:02

## 2020-12-23 RX ADMIN — NICOTINE SCH: 21 PATCH TRANSDERMAL at 10:27

## 2020-12-24 ENCOUNTER — HOSPITAL ENCOUNTER (INPATIENT)
Dept: HOSPITAL 74 - YASAS | Age: 50
LOS: 14 days | Discharge: HOME | DRG: 772 | End: 2021-01-07
Attending: ALLERGY & IMMUNOLOGY | Admitting: ALLERGY & IMMUNOLOGY
Payer: COMMERCIAL

## 2020-12-24 VITALS — TEMPERATURE: 98.4 F | DIASTOLIC BLOOD PRESSURE: 61 MMHG | HEART RATE: 80 BPM | SYSTOLIC BLOOD PRESSURE: 108 MMHG

## 2020-12-24 DIAGNOSIS — G89.29: ICD-10-CM

## 2020-12-24 DIAGNOSIS — F10.20: Primary | ICD-10-CM

## 2020-12-24 DIAGNOSIS — M19.90: ICD-10-CM

## 2020-12-24 DIAGNOSIS — M54.89: ICD-10-CM

## 2020-12-24 DIAGNOSIS — Z86.73: ICD-10-CM

## 2020-12-24 DIAGNOSIS — E78.5: ICD-10-CM

## 2020-12-24 PROCEDURE — HZ42ZZZ GROUP COUNSELING FOR SUBSTANCE ABUSE TREATMENT, COGNITIVE-BEHAVIORAL: ICD-10-PCS | Performed by: ALLERGY & IMMUNOLOGY

## 2020-12-24 RX ADMIN — FLUTICASONE PROPIONATE SCH SPRAY: 50 SPRAY, METERED NASAL at 09:29

## 2020-12-24 RX ADMIN — IBUPROFEN PRN MG: 400 TABLET, FILM COATED ORAL at 09:29

## 2020-12-24 RX ADMIN — SERTRALINE HYDROCHLORIDE SCH MG: 50 TABLET ORAL at 09:28

## 2020-12-24 RX ADMIN — Medication SCH: at 21:07

## 2020-12-24 RX ADMIN — HYDROXYZINE PAMOATE SCH MG: 25 CAPSULE ORAL at 06:08

## 2020-12-24 RX ADMIN — Medication SCH TAB: at 09:29

## 2020-12-24 RX ADMIN — NICOTINE SCH: 21 PATCH TRANSDERMAL at 09:28

## 2020-12-24 RX ADMIN — HYDROXYZINE PAMOATE SCH MG: 25 CAPSULE ORAL at 09:28

## 2020-12-24 RX ADMIN — NITROFURANTOIN MACROCRYSTALS SCH MG: 50 CAPSULE ORAL at 18:45

## 2020-12-24 RX ADMIN — AMOXICILLIN SCH MG: 500 CAPSULE ORAL at 06:08

## 2020-12-24 RX ADMIN — QUETIAPINE FUMARATE SCH MG: 100 TABLET ORAL at 21:08

## 2020-12-24 RX ADMIN — Medication SCH MG: at 09:28

## 2020-12-25 RX ADMIN — Medication SCH TAB: at 09:47

## 2020-12-25 RX ADMIN — FLUTICASONE PROPIONATE PRN SPRAY: 50 SPRAY, METERED NASAL at 17:32

## 2020-12-25 RX ADMIN — Medication SCH MG: at 21:46

## 2020-12-25 RX ADMIN — SERTRALINE HYDROCHLORIDE SCH MG: 50 TABLET ORAL at 09:47

## 2020-12-25 RX ADMIN — NICOTINE SCH: 21 PATCH TRANSDERMAL at 09:47

## 2020-12-25 RX ADMIN — HYDROXYZINE PAMOATE PRN MG: 25 CAPSULE ORAL at 07:08

## 2020-12-25 RX ADMIN — NITROFURANTOIN MACROCRYSTALS SCH MG: 50 CAPSULE ORAL at 00:05

## 2020-12-25 RX ADMIN — NITROFURANTOIN MACROCRYSTALS SCH MG: 50 CAPSULE ORAL at 15:23

## 2020-12-25 RX ADMIN — FLUTICASONE PROPIONATE PRN SPRAY: 50 SPRAY, METERED NASAL at 09:47

## 2020-12-25 RX ADMIN — NITROFURANTOIN MACROCRYSTALS SCH MG: 50 CAPSULE ORAL at 06:30

## 2020-12-25 RX ADMIN — NITROFURANTOIN MACROCRYSTALS SCH MG: 50 CAPSULE ORAL at 17:32

## 2020-12-25 RX ADMIN — HYDROXYZINE PAMOATE PRN MG: 25 CAPSULE ORAL at 21:46

## 2020-12-25 RX ADMIN — HYDROXYZINE PAMOATE PRN MG: 25 CAPSULE ORAL at 09:47

## 2020-12-25 RX ADMIN — QUETIAPINE FUMARATE SCH MG: 100 TABLET ORAL at 21:46

## 2020-12-26 RX ADMIN — Medication SCH MG: at 21:19

## 2020-12-26 RX ADMIN — NITROFURANTOIN MACROCRYSTALS SCH MG: 50 CAPSULE ORAL at 12:10

## 2020-12-26 RX ADMIN — FLUTICASONE PROPIONATE PRN SPRAY: 50 SPRAY, METERED NASAL at 21:19

## 2020-12-26 RX ADMIN — NITROFURANTOIN MACROCRYSTALS SCH MG: 50 CAPSULE ORAL at 23:49

## 2020-12-26 RX ADMIN — NITROFURANTOIN MACROCRYSTALS SCH MG: 50 CAPSULE ORAL at 17:19

## 2020-12-26 RX ADMIN — IBUPROFEN PRN MG: 400 TABLET, FILM COATED ORAL at 09:47

## 2020-12-26 RX ADMIN — NICOTINE SCH: 21 PATCH TRANSDERMAL at 09:47

## 2020-12-26 RX ADMIN — Medication SCH TAB: at 09:47

## 2020-12-26 RX ADMIN — HYDROXYZINE PAMOATE PRN MG: 25 CAPSULE ORAL at 09:47

## 2020-12-26 RX ADMIN — SERTRALINE HYDROCHLORIDE SCH MG: 50 TABLET ORAL at 09:47

## 2020-12-26 RX ADMIN — QUETIAPINE FUMARATE SCH MG: 100 TABLET ORAL at 21:19

## 2020-12-26 RX ADMIN — NITROFURANTOIN MACROCRYSTALS SCH MG: 50 CAPSULE ORAL at 00:10

## 2020-12-26 RX ADMIN — NITROFURANTOIN MACROCRYSTALS SCH MG: 50 CAPSULE ORAL at 06:30

## 2020-12-27 RX ADMIN — FLUTICASONE PROPIONATE PRN SPRAY: 50 SPRAY, METERED NASAL at 22:03

## 2020-12-27 RX ADMIN — Medication SCH MG: at 22:05

## 2020-12-27 RX ADMIN — NICOTINE SCH: 21 PATCH TRANSDERMAL at 10:04

## 2020-12-27 RX ADMIN — Medication SCH TAB: at 10:04

## 2020-12-27 RX ADMIN — SERTRALINE HYDROCHLORIDE SCH MG: 50 TABLET ORAL at 10:04

## 2020-12-27 RX ADMIN — NITROFURANTOIN MACROCRYSTALS SCH MG: 50 CAPSULE ORAL at 06:18

## 2020-12-27 RX ADMIN — HYDROXYZINE PAMOATE PRN MG: 25 CAPSULE ORAL at 17:43

## 2020-12-27 RX ADMIN — QUETIAPINE FUMARATE SCH MG: 100 TABLET ORAL at 22:05

## 2020-12-27 RX ADMIN — NITROFURANTOIN MACROCRYSTALS SCH MG: 50 CAPSULE ORAL at 17:43

## 2020-12-27 RX ADMIN — NITROFURANTOIN MACROCRYSTALS SCH: 50 CAPSULE ORAL at 13:30

## 2020-12-27 RX ADMIN — FLUTICASONE PROPIONATE PRN SPRAY: 50 SPRAY, METERED NASAL at 18:37

## 2020-12-28 RX ADMIN — NITROFURANTOIN MACROCRYSTALS SCH MG: 50 CAPSULE ORAL at 17:44

## 2020-12-28 RX ADMIN — NITROFURANTOIN MACROCRYSTALS SCH MG: 50 CAPSULE ORAL at 06:45

## 2020-12-28 RX ADMIN — Medication SCH MG: at 21:11

## 2020-12-28 RX ADMIN — NITROFURANTOIN MACROCRYSTALS SCH MG: 50 CAPSULE ORAL at 23:28

## 2020-12-28 RX ADMIN — NITROFURANTOIN MACROCRYSTALS SCH MG: 50 CAPSULE ORAL at 01:08

## 2020-12-28 RX ADMIN — HYDROXYZINE PAMOATE PRN MG: 25 CAPSULE ORAL at 17:44

## 2020-12-28 RX ADMIN — Medication SCH TAB: at 09:35

## 2020-12-28 RX ADMIN — QUETIAPINE FUMARATE SCH MG: 100 TABLET ORAL at 21:11

## 2020-12-28 RX ADMIN — NITROFURANTOIN MACROCRYSTALS SCH MG: 50 CAPSULE ORAL at 12:54

## 2020-12-28 RX ADMIN — NICOTINE SCH: 21 PATCH TRANSDERMAL at 09:35

## 2020-12-28 RX ADMIN — SERTRALINE HYDROCHLORIDE SCH MG: 50 TABLET ORAL at 09:35

## 2020-12-29 RX ADMIN — ACETAMINOPHEN PRN MG: 325 TABLET ORAL at 11:37

## 2020-12-29 RX ADMIN — Medication SCH TAB: at 09:21

## 2020-12-29 RX ADMIN — QUETIAPINE FUMARATE SCH MG: 100 TABLET ORAL at 21:28

## 2020-12-29 RX ADMIN — NITROFURANTOIN MACROCRYSTALS SCH MG: 50 CAPSULE ORAL at 07:17

## 2020-12-29 RX ADMIN — NICOTINE SCH: 21 PATCH TRANSDERMAL at 09:21

## 2020-12-29 RX ADMIN — NITROFURANTOIN MACROCRYSTALS SCH MG: 50 CAPSULE ORAL at 17:28

## 2020-12-29 RX ADMIN — SERTRALINE HYDROCHLORIDE SCH MG: 50 TABLET ORAL at 09:21

## 2020-12-29 RX ADMIN — Medication SCH: at 21:28

## 2020-12-29 RX ADMIN — NITROFURANTOIN MACROCRYSTALS SCH MG: 50 CAPSULE ORAL at 11:38

## 2020-12-30 RX ADMIN — Medication SCH TAB: at 09:28

## 2020-12-30 RX ADMIN — NITROFURANTOIN MACROCRYSTALS SCH MG: 50 CAPSULE ORAL at 06:05

## 2020-12-30 RX ADMIN — QUETIAPINE FUMARATE SCH MG: 100 TABLET ORAL at 21:11

## 2020-12-30 RX ADMIN — Medication SCH: at 21:11

## 2020-12-30 RX ADMIN — ACETAMINOPHEN PRN MG: 325 TABLET ORAL at 16:22

## 2020-12-30 RX ADMIN — NITROFURANTOIN MACROCRYSTALS SCH MG: 50 CAPSULE ORAL at 00:15

## 2020-12-30 RX ADMIN — NICOTINE SCH: 21 PATCH TRANSDERMAL at 09:28

## 2020-12-30 RX ADMIN — SERTRALINE HYDROCHLORIDE SCH MG: 50 TABLET ORAL at 09:28

## 2020-12-31 RX ADMIN — NICOTINE SCH: 21 PATCH TRANSDERMAL at 09:42

## 2020-12-31 RX ADMIN — HYDROXYZINE PAMOATE PRN MG: 25 CAPSULE ORAL at 09:42

## 2020-12-31 RX ADMIN — SERTRALINE HYDROCHLORIDE SCH MG: 50 TABLET ORAL at 09:42

## 2020-12-31 RX ADMIN — ACETAMINOPHEN PRN MG: 325 TABLET ORAL at 14:34

## 2020-12-31 RX ADMIN — Medication SCH TAB: at 09:41

## 2020-12-31 RX ADMIN — Medication SCH: at 21:32

## 2020-12-31 RX ADMIN — QUETIAPINE FUMARATE SCH MG: 100 TABLET ORAL at 21:31

## 2020-12-31 RX ADMIN — Medication SCH MG: at 21:31

## 2021-01-01 RX ADMIN — NICOTINE SCH: 21 PATCH TRANSDERMAL at 09:32

## 2021-01-01 RX ADMIN — Medication SCH: at 21:10

## 2021-01-01 RX ADMIN — Medication SCH TAB: at 09:32

## 2021-01-01 RX ADMIN — QUETIAPINE FUMARATE SCH MG: 100 TABLET ORAL at 21:10

## 2021-01-01 RX ADMIN — Medication SCH MG: at 21:09

## 2021-01-01 RX ADMIN — SERTRALINE HYDROCHLORIDE SCH MG: 50 TABLET ORAL at 09:32

## 2021-01-02 RX ADMIN — IBUPROFEN PRN MG: 400 TABLET, FILM COATED ORAL at 18:25

## 2021-01-02 RX ADMIN — SERTRALINE HYDROCHLORIDE SCH MG: 50 TABLET ORAL at 09:41

## 2021-01-02 RX ADMIN — Medication SCH MG: at 21:14

## 2021-01-02 RX ADMIN — NICOTINE SCH: 21 PATCH TRANSDERMAL at 09:42

## 2021-01-02 RX ADMIN — QUETIAPINE FUMARATE SCH MG: 100 TABLET ORAL at 21:14

## 2021-01-02 RX ADMIN — Medication SCH: at 21:14

## 2021-01-02 RX ADMIN — Medication SCH TAB: at 09:41

## 2021-01-03 RX ADMIN — Medication SCH MG: at 21:43

## 2021-01-03 RX ADMIN — ACETAMINOPHEN PRN MG: 325 TABLET ORAL at 18:37

## 2021-01-03 RX ADMIN — QUETIAPINE FUMARATE SCH MG: 100 TABLET ORAL at 21:43

## 2021-01-03 RX ADMIN — Medication SCH MG: at 21:53

## 2021-01-03 RX ADMIN — NICOTINE SCH: 21 PATCH TRANSDERMAL at 09:22

## 2021-01-03 RX ADMIN — Medication SCH TAB: at 09:21

## 2021-01-03 RX ADMIN — SERTRALINE HYDROCHLORIDE SCH MG: 50 TABLET ORAL at 09:21

## 2021-01-04 RX ADMIN — Medication SCH TAB: at 09:34

## 2021-01-04 RX ADMIN — Medication SCH: at 21:08

## 2021-01-04 RX ADMIN — NICOTINE SCH: 21 PATCH TRANSDERMAL at 09:34

## 2021-01-04 RX ADMIN — Medication SCH MG: at 21:08

## 2021-01-04 RX ADMIN — SERTRALINE HYDROCHLORIDE SCH MG: 50 TABLET ORAL at 09:34

## 2021-01-04 RX ADMIN — QUETIAPINE FUMARATE SCH MG: 100 TABLET ORAL at 21:08

## 2021-01-04 RX ADMIN — ACETAMINOPHEN PRN MG: 325 TABLET ORAL at 18:14

## 2021-01-05 RX ADMIN — Medication SCH: at 21:13

## 2021-01-05 RX ADMIN — NICOTINE SCH: 21 PATCH TRANSDERMAL at 09:23

## 2021-01-05 RX ADMIN — Medication SCH TAB: at 09:23

## 2021-01-05 RX ADMIN — SERTRALINE HYDROCHLORIDE SCH: 50 TABLET ORAL at 09:23

## 2021-01-05 RX ADMIN — ACETAMINOPHEN PRN MG: 325 TABLET ORAL at 17:09

## 2021-01-05 RX ADMIN — QUETIAPINE FUMARATE SCH MG: 100 TABLET ORAL at 21:13

## 2021-01-05 RX ADMIN — Medication SCH MG: at 21:13

## 2021-01-06 RX ADMIN — ACETAMINOPHEN PRN MG: 325 TABLET ORAL at 16:55

## 2021-01-06 RX ADMIN — Medication SCH MG: at 21:08

## 2021-01-06 RX ADMIN — SERTRALINE HYDROCHLORIDE SCH: 50 TABLET ORAL at 09:22

## 2021-01-06 RX ADMIN — NICOTINE SCH: 21 PATCH TRANSDERMAL at 09:22

## 2021-01-06 RX ADMIN — Medication SCH TAB: at 09:22

## 2021-01-06 RX ADMIN — Medication SCH: at 21:08

## 2021-01-06 RX ADMIN — QUETIAPINE FUMARATE SCH MG: 100 TABLET ORAL at 21:08

## 2021-01-07 VITALS — DIASTOLIC BLOOD PRESSURE: 71 MMHG | SYSTOLIC BLOOD PRESSURE: 107 MMHG | TEMPERATURE: 97.3 F | HEART RATE: 86 BPM

## 2021-01-07 RX ADMIN — Medication SCH: at 09:10

## 2021-01-07 RX ADMIN — NICOTINE SCH: 21 PATCH TRANSDERMAL at 09:10

## 2021-01-07 RX ADMIN — SERTRALINE HYDROCHLORIDE SCH: 50 TABLET ORAL at 09:10

## 2021-02-16 ENCOUNTER — HOSPITAL ENCOUNTER (INPATIENT)
Dept: HOSPITAL 74 - YASAS | Age: 51
LOS: 5 days | Discharge: HOME | End: 2021-02-21
Attending: ALLERGY & IMMUNOLOGY | Admitting: ALLERGY & IMMUNOLOGY
Payer: COMMERCIAL

## 2021-02-16 VITALS — BODY MASS INDEX: 28.9 KG/M2

## 2021-02-16 DIAGNOSIS — F34.1: ICD-10-CM

## 2021-02-16 DIAGNOSIS — M54.89: ICD-10-CM

## 2021-02-16 DIAGNOSIS — Z59.0: ICD-10-CM

## 2021-02-16 DIAGNOSIS — Z86.69: ICD-10-CM

## 2021-02-16 DIAGNOSIS — Z86.19: ICD-10-CM

## 2021-02-16 DIAGNOSIS — F31.9: ICD-10-CM

## 2021-02-16 DIAGNOSIS — F17.210: ICD-10-CM

## 2021-02-16 DIAGNOSIS — R76.11: ICD-10-CM

## 2021-02-16 DIAGNOSIS — M19.032: ICD-10-CM

## 2021-02-16 DIAGNOSIS — F19.24: ICD-10-CM

## 2021-02-16 DIAGNOSIS — F51.05: ICD-10-CM

## 2021-02-16 DIAGNOSIS — Z86.73: ICD-10-CM

## 2021-02-16 DIAGNOSIS — M17.0: ICD-10-CM

## 2021-02-16 DIAGNOSIS — F10.282: ICD-10-CM

## 2021-02-16 DIAGNOSIS — F10.230: Primary | ICD-10-CM

## 2021-02-16 DIAGNOSIS — F10.24: ICD-10-CM

## 2021-02-16 DIAGNOSIS — G89.29: ICD-10-CM

## 2021-02-16 DIAGNOSIS — Z56.0: ICD-10-CM

## 2021-02-16 DIAGNOSIS — E78.5: ICD-10-CM

## 2021-02-16 LAB
ALBUMIN SERPL-MCNC: 4 G/DL (ref 3.4–5)
ALP SERPL-CCNC: 84 U/L (ref 45–117)
ALT SERPL-CCNC: 35 U/L (ref 13–61)
ANION GAP SERPL CALC-SCNC: 7 MMOL/L (ref 8–16)
AST SERPL-CCNC: 35 U/L (ref 15–37)
BILIRUB SERPL-MCNC: 0.6 MG/DL (ref 0.2–1)
BUN SERPL-MCNC: 7.3 MG/DL (ref 7–18)
CALCIUM SERPL-MCNC: 8.8 MG/DL (ref 8.5–10.1)
CHLORIDE SERPL-SCNC: 106 MMOL/L (ref 98–107)
CO2 SERPL-SCNC: 26 MMOL/L (ref 21–32)
CREAT SERPL-MCNC: 0.8 MG/DL (ref 0.55–1.3)
DEPRECATED RDW RBC AUTO: 17.9 % (ref 11.9–15.9)
GLUCOSE SERPL-MCNC: 109 MG/DL (ref 74–106)
HCT VFR BLD CALC: 39.4 % (ref 35.4–49)
HGB BLD-MCNC: 13.3 GM/DL (ref 11.7–16.9)
HIV 1+2 AB+HIV1 P24 AG SERPL QL IA: NEGATIVE
MCH RBC QN AUTO: 32.9 PG (ref 25.7–33.7)
MCHC RBC AUTO-ENTMCNC: 33.7 G/DL (ref 32–35.9)
MCV RBC: 97.7 FL (ref 80–96)
PLATELET # BLD AUTO: 148 K/MM3 (ref 134–434)
PMV BLD: 7.7 FL (ref 7.5–11.1)
POTASSIUM SERPLBLD-SCNC: 3.8 MMOL/L (ref 3.5–5.1)
PROT SERPL-MCNC: 8 G/DL (ref 6.4–8.2)
RBC # BLD AUTO: 4.03 M/MM3 (ref 4–5.6)
SODIUM SERPL-SCNC: 140 MMOL/L (ref 136–145)
WBC # BLD AUTO: 5.5 K/MM3 (ref 4–10)

## 2021-02-16 PROCEDURE — C9803 HOPD COVID-19 SPEC COLLECT: HCPCS

## 2021-02-16 PROCEDURE — HZ2ZZZZ DETOXIFICATION SERVICES FOR SUBSTANCE ABUSE TREATMENT: ICD-10-PCS | Performed by: ALLERGY & IMMUNOLOGY

## 2021-02-16 PROCEDURE — U0003 INFECTIOUS AGENT DETECTION BY NUCLEIC ACID (DNA OR RNA); SEVERE ACUTE RESPIRATORY SYNDROME CORONAVIRUS 2 (SARS-COV-2) (CORONAVIRUS DISEASE [COVID-19]), AMPLIFIED PROBE TECHNIQUE, MAKING USE OF HIGH THROUGHPUT TECHNOLOGIES AS DESCRIBED BY CMS-2020-01-R: HCPCS

## 2021-02-16 RX ADMIN — HYDROXYZINE PAMOATE SCH: 25 CAPSULE ORAL at 22:19

## 2021-02-16 RX ADMIN — Medication SCH TAB: at 13:52

## 2021-02-16 RX ADMIN — METHOCARBAMOL PRN MG: 500 TABLET ORAL at 13:49

## 2021-02-16 RX ADMIN — SERTRALINE HYDROCHLORIDE SCH MG: 50 TABLET ORAL at 15:46

## 2021-02-16 RX ADMIN — HYDROXYZINE PAMOATE SCH MG: 25 CAPSULE ORAL at 13:52

## 2021-02-16 RX ADMIN — Medication SCH MG: at 22:18

## 2021-02-16 RX ADMIN — NICOTINE SCH MG: 14 PATCH, EXTENDED RELEASE TRANSDERMAL at 13:53

## 2021-02-16 RX ADMIN — HYDROXYZINE PAMOATE SCH MG: 25 CAPSULE ORAL at 18:04

## 2021-02-16 RX ADMIN — Medication SCH: at 22:19

## 2021-02-16 RX ADMIN — QUETIAPINE FUMARATE SCH MG: 100 TABLET ORAL at 22:18

## 2021-02-16 SDOH — ECONOMIC STABILITY - HOUSING INSECURITY: HOMELESSNESS: Z59.0

## 2021-02-16 SDOH — ECONOMIC STABILITY - INCOME SECURITY: UNEMPLOYMENT, UNSPECIFIED: Z56.0

## 2021-02-17 RX ADMIN — Medication SCH MG: at 22:08

## 2021-02-17 RX ADMIN — SERTRALINE HYDROCHLORIDE SCH MG: 50 TABLET ORAL at 10:51

## 2021-02-17 RX ADMIN — QUETIAPINE FUMARATE SCH MG: 100 TABLET ORAL at 22:08

## 2021-02-17 RX ADMIN — HYDROXYZINE PAMOATE SCH MG: 25 CAPSULE ORAL at 18:50

## 2021-02-17 RX ADMIN — HYDROXYZINE PAMOATE SCH: 25 CAPSULE ORAL at 10:52

## 2021-02-17 RX ADMIN — HYDROXYZINE PAMOATE SCH: 25 CAPSULE ORAL at 14:07

## 2021-02-17 RX ADMIN — NICOTINE SCH: 14 PATCH, EXTENDED RELEASE TRANSDERMAL at 10:51

## 2021-02-17 RX ADMIN — HYDROXYZINE PAMOATE SCH MG: 25 CAPSULE ORAL at 22:08

## 2021-02-17 RX ADMIN — Medication SCH TAB: at 10:51

## 2021-02-17 RX ADMIN — HYDROXYZINE PAMOATE SCH MG: 25 CAPSULE ORAL at 05:36

## 2021-02-18 RX ADMIN — Medication SCH TAB: at 10:28

## 2021-02-18 RX ADMIN — Medication SCH: at 22:11

## 2021-02-18 RX ADMIN — SERTRALINE HYDROCHLORIDE SCH MG: 50 TABLET ORAL at 10:29

## 2021-02-18 RX ADMIN — HYDROXYZINE PAMOATE SCH: 25 CAPSULE ORAL at 10:30

## 2021-02-18 RX ADMIN — NICOTINE SCH: 14 PATCH, EXTENDED RELEASE TRANSDERMAL at 10:28

## 2021-02-18 RX ADMIN — HYDROXYZINE PAMOATE SCH: 25 CAPSULE ORAL at 22:11

## 2021-02-18 RX ADMIN — QUETIAPINE FUMARATE SCH MG: 100 TABLET ORAL at 22:11

## 2021-02-18 RX ADMIN — HYDROXYZINE PAMOATE SCH: 25 CAPSULE ORAL at 13:49

## 2021-02-18 RX ADMIN — HYDROXYZINE PAMOATE SCH MG: 25 CAPSULE ORAL at 17:56

## 2021-02-18 RX ADMIN — Medication SCH MG: at 22:11

## 2021-02-18 RX ADMIN — HYDROXYZINE PAMOATE SCH MG: 25 CAPSULE ORAL at 05:42

## 2021-02-19 RX ADMIN — QUETIAPINE FUMARATE SCH MG: 100 TABLET ORAL at 22:31

## 2021-02-19 RX ADMIN — HYDROXYZINE PAMOATE SCH MG: 25 CAPSULE ORAL at 10:13

## 2021-02-19 RX ADMIN — Medication SCH MG: at 22:31

## 2021-02-19 RX ADMIN — METHOCARBAMOL PRN MG: 500 TABLET ORAL at 22:31

## 2021-02-19 RX ADMIN — HYDROXYZINE PAMOATE SCH MG: 25 CAPSULE ORAL at 18:25

## 2021-02-19 RX ADMIN — Medication SCH TAB: at 10:13

## 2021-02-19 RX ADMIN — SERTRALINE HYDROCHLORIDE SCH MG: 50 TABLET ORAL at 10:14

## 2021-02-19 RX ADMIN — HYDROXYZINE PAMOATE SCH MG: 25 CAPSULE ORAL at 06:24

## 2021-02-19 RX ADMIN — HYDROXYZINE PAMOATE SCH MG: 25 CAPSULE ORAL at 13:12

## 2021-02-19 RX ADMIN — NICOTINE SCH: 14 PATCH, EXTENDED RELEASE TRANSDERMAL at 10:13

## 2021-02-19 RX ADMIN — HYDROXYZINE PAMOATE SCH MG: 25 CAPSULE ORAL at 22:31

## 2021-02-20 VITALS — TEMPERATURE: 97.5 F

## 2021-02-20 RX ADMIN — Medication SCH MG: at 22:36

## 2021-02-20 RX ADMIN — HYDROXYZINE PAMOATE SCH MG: 25 CAPSULE ORAL at 17:51

## 2021-02-20 RX ADMIN — HYDROXYZINE PAMOATE SCH MG: 25 CAPSULE ORAL at 22:36

## 2021-02-20 RX ADMIN — SERTRALINE HYDROCHLORIDE SCH MG: 50 TABLET ORAL at 10:20

## 2021-02-20 RX ADMIN — HYDROXYZINE PAMOATE SCH MG: 25 CAPSULE ORAL at 06:02

## 2021-02-20 RX ADMIN — Medication SCH TAB: at 10:20

## 2021-02-20 RX ADMIN — HYDROXYZINE PAMOATE SCH MG: 25 CAPSULE ORAL at 13:08

## 2021-02-20 RX ADMIN — NICOTINE SCH: 14 PATCH, EXTENDED RELEASE TRANSDERMAL at 10:20

## 2021-02-20 RX ADMIN — QUETIAPINE FUMARATE SCH MG: 100 TABLET ORAL at 22:36

## 2021-02-20 RX ADMIN — HYDROXYZINE PAMOATE SCH MG: 25 CAPSULE ORAL at 10:20

## 2021-02-21 VITALS — SYSTOLIC BLOOD PRESSURE: 100 MMHG | HEART RATE: 58 BPM | DIASTOLIC BLOOD PRESSURE: 60 MMHG

## 2021-02-21 RX ADMIN — Medication SCH TAB: at 10:33

## 2021-02-21 RX ADMIN — HYDROXYZINE PAMOATE SCH MG: 25 CAPSULE ORAL at 10:33

## 2021-02-21 RX ADMIN — NICOTINE SCH MG: 14 PATCH, EXTENDED RELEASE TRANSDERMAL at 10:33

## 2021-02-21 RX ADMIN — HYDROXYZINE PAMOATE SCH MG: 25 CAPSULE ORAL at 06:10

## 2021-02-21 RX ADMIN — SERTRALINE HYDROCHLORIDE SCH MG: 50 TABLET ORAL at 10:33

## 2022-05-15 ENCOUNTER — HOSPITAL ENCOUNTER (INPATIENT)
Dept: HOSPITAL 74 - YASAS | Age: 52
LOS: 5 days | Discharge: OTHER INSTITUTION | End: 2022-05-20
Attending: SURGERY | Admitting: ALLERGY & IMMUNOLOGY
Payer: COMMERCIAL

## 2022-05-15 VITALS — BODY MASS INDEX: 28.3 KG/M2

## 2022-05-15 DIAGNOSIS — Z59.00: ICD-10-CM

## 2022-05-15 DIAGNOSIS — Z86.73: ICD-10-CM

## 2022-05-15 DIAGNOSIS — F10.24: ICD-10-CM

## 2022-05-15 DIAGNOSIS — F10.230: Primary | ICD-10-CM

## 2022-05-15 DIAGNOSIS — Z86.19: ICD-10-CM

## 2022-05-15 DIAGNOSIS — G89.29: ICD-10-CM

## 2022-05-15 DIAGNOSIS — Z86.11: ICD-10-CM

## 2022-05-15 DIAGNOSIS — E78.5: ICD-10-CM

## 2022-05-15 DIAGNOSIS — Z56.0: ICD-10-CM

## 2022-05-15 DIAGNOSIS — Z28.310: ICD-10-CM

## 2022-05-15 DIAGNOSIS — M54.89: ICD-10-CM

## 2022-05-15 DIAGNOSIS — R73.9: ICD-10-CM

## 2022-05-15 DIAGNOSIS — I10: ICD-10-CM

## 2022-05-15 DIAGNOSIS — F31.9: ICD-10-CM

## 2022-05-15 PROCEDURE — U0003 INFECTIOUS AGENT DETECTION BY NUCLEIC ACID (DNA OR RNA); SEVERE ACUTE RESPIRATORY SYNDROME CORONAVIRUS 2 (SARS-COV-2) (CORONAVIRUS DISEASE [COVID-19]), AMPLIFIED PROBE TECHNIQUE, MAKING USE OF HIGH THROUGHPUT TECHNOLOGIES AS DESCRIBED BY CMS-2020-01-R: HCPCS

## 2022-05-15 PROCEDURE — HZ2ZZZZ DETOXIFICATION SERVICES FOR SUBSTANCE ABUSE TREATMENT: ICD-10-PCS | Performed by: ALLERGY & IMMUNOLOGY

## 2022-05-15 PROCEDURE — U0005 INFEC AGEN DETEC AMPLI PROBE: HCPCS

## 2022-05-15 RX ADMIN — HYDROXYZINE PAMOATE PRN MG: 25 CAPSULE ORAL at 20:11

## 2022-05-15 RX ADMIN — METHOCARBAMOL PRN MG: 500 TABLET ORAL at 20:11

## 2022-05-15 RX ADMIN — Medication SCH MG: at 22:27

## 2022-05-15 RX ADMIN — IBUPROFEN PRN MG: 400 TABLET, FILM COATED ORAL at 17:45

## 2022-05-15 SDOH — ECONOMIC STABILITY - INCOME SECURITY: UNEMPLOYMENT, UNSPECIFIED: Z56.0

## 2022-05-15 SDOH — ECONOMIC STABILITY - HOUSING INSECURITY: HOMELESSNESS UNSPECIFIED: Z59.00

## 2022-05-16 LAB
ALBUMIN SERPL-MCNC: 3.3 G/DL (ref 3.4–5)
ALP SERPL-CCNC: 68 U/L (ref 45–117)
ALT SERPL-CCNC: 51 U/L (ref 13–61)
ANION GAP SERPL CALC-SCNC: 6 MMOL/L (ref 8–16)
AST SERPL-CCNC: 60 U/L (ref 15–37)
BILIRUB SERPL-MCNC: 0.9 MG/DL (ref 0.2–1)
BUN SERPL-MCNC: 10.2 MG/DL (ref 7–18)
CALCIUM SERPL-MCNC: 8.5 MG/DL (ref 8.5–10.1)
CHLORIDE SERPL-SCNC: 105 MMOL/L (ref 98–107)
CO2 SERPL-SCNC: 29 MMOL/L (ref 21–32)
CREAT SERPL-MCNC: 0.7 MG/DL (ref 0.55–1.3)
DEPRECATED RDW RBC AUTO: 14.6 % (ref 11.9–15.9)
GLUCOSE SERPL-MCNC: 98 MG/DL (ref 74–106)
HCT VFR BLD CALC: 35.7 % (ref 35.4–49)
HGB BLD-MCNC: 12.2 GM/DL (ref 11.7–16.9)
MCH RBC QN AUTO: 33.8 PG (ref 25.7–33.7)
MCHC RBC AUTO-ENTMCNC: 34.1 G/DL (ref 32–35.9)
MCV RBC: 99.1 FL (ref 80–96)
PLATELET # BLD AUTO: 65 10^3/UL (ref 134–434)
PMV BLD: 8.7 FL (ref 7.5–11.1)
PROT SERPL-MCNC: 6.4 G/DL (ref 6.4–8.2)
RBC # BLD AUTO: 3.6 M/MM3 (ref 4–5.6)
SODIUM SERPL-SCNC: 139 MMOL/L (ref 136–145)
WBC # BLD AUTO: 4.4 K/MM3 (ref 4–10)

## 2022-05-16 RX ADMIN — Medication SCH MG: at 23:59

## 2022-05-16 RX ADMIN — QUETIAPINE FUMARATE SCH MG: 100 TABLET ORAL at 22:06

## 2022-05-16 RX ADMIN — Medication SCH MG: at 22:06

## 2022-05-16 RX ADMIN — METHOCARBAMOL PRN MG: 500 TABLET ORAL at 05:20

## 2022-05-16 RX ADMIN — HYDROXYZINE PAMOATE PRN MG: 25 CAPSULE ORAL at 23:59

## 2022-05-16 RX ADMIN — Medication SCH TAB: at 10:31

## 2022-05-16 RX ADMIN — Medication SCH: at 23:44

## 2022-05-17 RX ADMIN — IBUPROFEN PRN MG: 400 TABLET, FILM COATED ORAL at 06:13

## 2022-05-17 RX ADMIN — Medication SCH MG: at 22:03

## 2022-05-17 RX ADMIN — QUETIAPINE FUMARATE SCH MG: 100 TABLET ORAL at 22:03

## 2022-05-17 RX ADMIN — HYDROXYZINE PAMOATE PRN MG: 25 CAPSULE ORAL at 16:59

## 2022-05-17 RX ADMIN — METHOCARBAMOL PRN MG: 500 TABLET ORAL at 10:25

## 2022-05-17 RX ADMIN — HYDROXYZINE PAMOATE PRN MG: 25 CAPSULE ORAL at 10:25

## 2022-05-17 RX ADMIN — Medication SCH TAB: at 10:25

## 2022-05-18 RX ADMIN — HYDROXYZINE PAMOATE PRN MG: 25 CAPSULE ORAL at 10:39

## 2022-05-18 RX ADMIN — Medication SCH MG: at 22:03

## 2022-05-18 RX ADMIN — ACETAMINOPHEN PRN MG: 325 TABLET ORAL at 14:40

## 2022-05-18 RX ADMIN — Medication SCH TAB: at 10:40

## 2022-05-18 RX ADMIN — METHOCARBAMOL PRN MG: 500 TABLET ORAL at 05:08

## 2022-05-18 RX ADMIN — QUETIAPINE FUMARATE SCH MG: 100 TABLET ORAL at 22:03

## 2022-05-18 RX ADMIN — HYDROXYZINE PAMOATE PRN MG: 25 CAPSULE ORAL at 03:58

## 2022-05-18 RX ADMIN — METHOCARBAMOL PRN MG: 500 TABLET ORAL at 10:39

## 2022-05-19 RX ADMIN — Medication SCH MG: at 22:31

## 2022-05-19 RX ADMIN — METHOCARBAMOL PRN MG: 500 TABLET ORAL at 10:27

## 2022-05-19 RX ADMIN — HYDROXYZINE PAMOATE PRN MG: 25 CAPSULE ORAL at 10:27

## 2022-05-19 RX ADMIN — QUETIAPINE FUMARATE SCH MG: 100 TABLET ORAL at 22:31

## 2022-05-19 RX ADMIN — Medication SCH TAB: at 10:27

## 2022-05-19 RX ADMIN — ACETAMINOPHEN PRN MG: 325 TABLET ORAL at 17:04

## 2022-05-20 ENCOUNTER — HOSPITAL ENCOUNTER (INPATIENT)
Dept: HOSPITAL 74 - YASAS | Age: 52
LOS: 28 days | Discharge: HOME | DRG: 772 | End: 2022-06-17
Attending: PSYCHIATRY & NEUROLOGY | Admitting: ALLERGY & IMMUNOLOGY
Payer: COMMERCIAL

## 2022-05-20 VITALS — HEART RATE: 66 BPM | SYSTOLIC BLOOD PRESSURE: 102 MMHG | DIASTOLIC BLOOD PRESSURE: 56 MMHG | TEMPERATURE: 97.3 F

## 2022-05-20 DIAGNOSIS — G89.29: ICD-10-CM

## 2022-05-20 DIAGNOSIS — Z86.11: ICD-10-CM

## 2022-05-20 DIAGNOSIS — Z86.73: ICD-10-CM

## 2022-05-20 DIAGNOSIS — Z86.19: ICD-10-CM

## 2022-05-20 DIAGNOSIS — F31.81: ICD-10-CM

## 2022-05-20 DIAGNOSIS — D69.6: ICD-10-CM

## 2022-05-20 DIAGNOSIS — F10.20: Primary | ICD-10-CM

## 2022-05-20 DIAGNOSIS — F10.24: ICD-10-CM

## 2022-05-20 DIAGNOSIS — M54.50: ICD-10-CM

## 2022-05-20 DIAGNOSIS — E78.5: ICD-10-CM

## 2022-05-20 DIAGNOSIS — Z59.00: ICD-10-CM

## 2022-05-20 DIAGNOSIS — Z56.0: ICD-10-CM

## 2022-05-20 PROCEDURE — U0003 INFECTIOUS AGENT DETECTION BY NUCLEIC ACID (DNA OR RNA); SEVERE ACUTE RESPIRATORY SYNDROME CORONAVIRUS 2 (SARS-COV-2) (CORONAVIRUS DISEASE [COVID-19]), AMPLIFIED PROBE TECHNIQUE, MAKING USE OF HIGH THROUGHPUT TECHNOLOGIES AS DESCRIBED BY CMS-2020-01-R: HCPCS

## 2022-05-20 PROCEDURE — HZ42ZZZ GROUP COUNSELING FOR SUBSTANCE ABUSE TREATMENT, COGNITIVE-BEHAVIORAL: ICD-10-PCS | Performed by: PSYCHIATRY & NEUROLOGY

## 2022-05-20 PROCEDURE — U0005 INFEC AGEN DETEC AMPLI PROBE: HCPCS

## 2022-05-20 RX ADMIN — Medication SCH MG: at 21:19

## 2022-05-20 RX ADMIN — QUETIAPINE FUMARATE SCH MG: 100 TABLET ORAL at 21:19

## 2022-05-20 RX ADMIN — ACETAMINOPHEN PRN MG: 325 TABLET ORAL at 19:50

## 2022-05-20 RX ADMIN — HYDROXYZINE PAMOATE PRN MG: 25 CAPSULE ORAL at 10:18

## 2022-05-20 RX ADMIN — Medication SCH TAB: at 10:18

## 2022-05-20 SDOH — ECONOMIC STABILITY - HOUSING INSECURITY: HOMELESSNESS UNSPECIFIED: Z59.00

## 2022-05-20 SDOH — ECONOMIC STABILITY - INCOME SECURITY: UNEMPLOYMENT, UNSPECIFIED: Z56.0

## 2022-05-21 RX ADMIN — Medication SCH MG: at 21:08

## 2022-05-21 RX ADMIN — MAGNESIUM HYDROXIDE PRN ML: 400 SUSPENSION ORAL at 17:12

## 2022-05-21 RX ADMIN — QUETIAPINE FUMARATE SCH MG: 100 TABLET ORAL at 21:08

## 2022-05-21 RX ADMIN — Medication SCH TAB: at 10:57

## 2022-05-22 RX ADMIN — Medication SCH MG: at 21:13

## 2022-05-22 RX ADMIN — Medication SCH TAB: at 10:36

## 2022-05-22 RX ADMIN — MAGNESIUM HYDROXIDE PRN ML: 400 SUSPENSION ORAL at 07:04

## 2022-05-22 RX ADMIN — QUETIAPINE FUMARATE SCH MG: 100 TABLET ORAL at 21:13

## 2022-05-22 RX ADMIN — SERTRALINE HYDROCHLORIDE SCH MG: 50 TABLET ORAL at 10:36

## 2022-05-22 RX ADMIN — HYDROXYZINE PAMOATE PRN MG: 25 CAPSULE ORAL at 10:36

## 2022-05-23 RX ADMIN — MAGNESIUM HYDROXIDE PRN ML: 400 SUSPENSION ORAL at 09:01

## 2022-05-23 RX ADMIN — HYDROXYZINE PAMOATE PRN MG: 25 CAPSULE ORAL at 09:58

## 2022-05-23 RX ADMIN — Medication SCH TAB: at 09:58

## 2022-05-23 RX ADMIN — QUETIAPINE FUMARATE SCH MG: 100 TABLET ORAL at 21:07

## 2022-05-23 RX ADMIN — Medication SCH MG: at 21:07

## 2022-05-23 RX ADMIN — Medication SCH MG: at 21:06

## 2022-05-23 RX ADMIN — NICOTINE PRN MG: 4 INHALANT RESPIRATORY (INHALATION) at 16:50

## 2022-05-23 RX ADMIN — SERTRALINE HYDROCHLORIDE SCH MG: 50 TABLET ORAL at 09:58

## 2022-05-24 RX ADMIN — Medication SCH TAB: at 09:49

## 2022-05-24 RX ADMIN — Medication SCH MG: at 21:04

## 2022-05-24 RX ADMIN — SERTRALINE HYDROCHLORIDE SCH MG: 50 TABLET ORAL at 09:49

## 2022-05-24 RX ADMIN — IBUPROFEN PRN MG: 400 TABLET, FILM COATED ORAL at 10:56

## 2022-05-24 RX ADMIN — QUETIAPINE FUMARATE SCH MG: 100 TABLET ORAL at 21:04

## 2022-05-25 RX ADMIN — MAGNESIUM HYDROXIDE PRN ML: 400 SUSPENSION ORAL at 07:12

## 2022-05-25 RX ADMIN — HYDROXYZINE PAMOATE PRN MG: 25 CAPSULE ORAL at 09:54

## 2022-05-25 RX ADMIN — QUETIAPINE FUMARATE SCH MG: 100 TABLET ORAL at 21:06

## 2022-05-25 RX ADMIN — Medication SCH: at 21:06

## 2022-05-25 RX ADMIN — ACETAMINOPHEN PRN MG: 325 TABLET ORAL at 16:53

## 2022-05-25 RX ADMIN — Medication SCH: at 21:07

## 2022-05-25 RX ADMIN — Medication SCH TAB: at 09:54

## 2022-05-25 RX ADMIN — SERTRALINE HYDROCHLORIDE SCH MG: 50 TABLET ORAL at 09:54

## 2022-05-25 RX ADMIN — IBUPROFEN PRN MG: 400 TABLET, FILM COATED ORAL at 14:09

## 2022-05-26 RX ADMIN — MAGNESIUM HYDROXIDE PRN ML: 400 SUSPENSION ORAL at 10:13

## 2022-05-26 RX ADMIN — ACETAMINOPHEN PRN MG: 325 TABLET ORAL at 14:26

## 2022-05-26 RX ADMIN — Medication SCH MG: at 21:10

## 2022-05-26 RX ADMIN — SERTRALINE HYDROCHLORIDE SCH MG: 50 TABLET ORAL at 10:13

## 2022-05-26 RX ADMIN — QUETIAPINE FUMARATE SCH MG: 100 TABLET ORAL at 21:10

## 2022-05-26 RX ADMIN — Medication SCH TAB: at 10:13

## 2022-05-26 RX ADMIN — HYDROXYZINE PAMOATE PRN MG: 25 CAPSULE ORAL at 10:13

## 2022-05-27 RX ADMIN — HYDROXYZINE PAMOATE PRN MG: 25 CAPSULE ORAL at 10:19

## 2022-05-27 RX ADMIN — Medication SCH MG: at 21:05

## 2022-05-27 RX ADMIN — Medication SCH TAB: at 10:19

## 2022-05-27 RX ADMIN — QUETIAPINE FUMARATE SCH MG: 100 TABLET ORAL at 21:05

## 2022-05-27 RX ADMIN — SERTRALINE HYDROCHLORIDE SCH MG: 50 TABLET ORAL at 10:19

## 2022-05-27 RX ADMIN — ACETAMINOPHEN PRN MG: 325 TABLET ORAL at 17:05

## 2022-05-28 RX ADMIN — Medication SCH MG: at 21:04

## 2022-05-28 RX ADMIN — SERTRALINE HYDROCHLORIDE SCH MG: 50 TABLET ORAL at 09:39

## 2022-05-28 RX ADMIN — QUETIAPINE FUMARATE SCH MG: 100 TABLET ORAL at 21:04

## 2022-05-28 RX ADMIN — Medication SCH TAB: at 09:39

## 2022-05-28 RX ADMIN — IBUPROFEN PRN MG: 400 TABLET, FILM COATED ORAL at 09:39

## 2022-05-29 RX ADMIN — SERTRALINE HYDROCHLORIDE SCH MG: 50 TABLET ORAL at 09:37

## 2022-05-29 RX ADMIN — Medication SCH TAB: at 09:37

## 2022-05-29 RX ADMIN — Medication SCH MG: at 22:20

## 2022-05-29 RX ADMIN — QUETIAPINE FUMARATE SCH MG: 100 TABLET ORAL at 22:20

## 2022-05-30 RX ADMIN — QUETIAPINE FUMARATE SCH MG: 100 TABLET ORAL at 21:49

## 2022-05-30 RX ADMIN — Medication SCH TAB: at 09:44

## 2022-05-30 RX ADMIN — Medication SCH: at 21:49

## 2022-05-30 RX ADMIN — SERTRALINE HYDROCHLORIDE SCH MG: 50 TABLET ORAL at 09:44

## 2022-05-30 RX ADMIN — IBUPROFEN PRN MG: 400 TABLET, FILM COATED ORAL at 11:22

## 2022-05-31 RX ADMIN — NICOTINE PRN MG: 4 INHALANT RESPIRATORY (INHALATION) at 15:23

## 2022-05-31 RX ADMIN — Medication SCH: at 21:05

## 2022-05-31 RX ADMIN — Medication SCH TAB: at 09:49

## 2022-05-31 RX ADMIN — QUETIAPINE FUMARATE SCH MG: 100 TABLET ORAL at 21:05

## 2022-05-31 RX ADMIN — SERTRALINE HYDROCHLORIDE SCH MG: 50 TABLET ORAL at 09:49

## 2022-06-01 RX ADMIN — SERTRALINE HYDROCHLORIDE SCH MG: 50 TABLET ORAL at 09:48

## 2022-06-01 RX ADMIN — Medication SCH: at 21:01

## 2022-06-01 RX ADMIN — Medication SCH TAB: at 09:48

## 2022-06-01 RX ADMIN — QUETIAPINE FUMARATE SCH MG: 100 TABLET ORAL at 21:01

## 2022-06-01 RX ADMIN — Medication SCH MG: at 21:01

## 2022-06-01 RX ADMIN — IBUPROFEN PRN MG: 400 TABLET, FILM COATED ORAL at 09:48

## 2022-06-02 RX ADMIN — Medication SCH TAB: at 10:17

## 2022-06-02 RX ADMIN — Medication SCH: at 21:00

## 2022-06-02 RX ADMIN — SERTRALINE HYDROCHLORIDE SCH MG: 50 TABLET ORAL at 10:17

## 2022-06-02 RX ADMIN — QUETIAPINE FUMARATE SCH MG: 100 TABLET ORAL at 21:00

## 2022-06-03 RX ADMIN — SERTRALINE HYDROCHLORIDE SCH MG: 50 TABLET ORAL at 09:51

## 2022-06-03 RX ADMIN — Medication SCH: at 21:03

## 2022-06-03 RX ADMIN — Medication SCH TAB: at 09:51

## 2022-06-03 RX ADMIN — HYDROXYZINE PAMOATE PRN MG: 25 CAPSULE ORAL at 10:16

## 2022-06-03 RX ADMIN — QUETIAPINE FUMARATE SCH MG: 100 TABLET ORAL at 21:03

## 2022-06-04 RX ADMIN — HYDROXYZINE PAMOATE PRN MG: 25 CAPSULE ORAL at 09:51

## 2022-06-04 RX ADMIN — Medication SCH: at 21:00

## 2022-06-04 RX ADMIN — SERTRALINE HYDROCHLORIDE SCH MG: 50 TABLET ORAL at 09:51

## 2022-06-04 RX ADMIN — QUETIAPINE FUMARATE SCH MG: 100 TABLET ORAL at 21:00

## 2022-06-04 RX ADMIN — Medication SCH TAB: at 09:51

## 2022-06-05 RX ADMIN — Medication SCH: at 21:35

## 2022-06-05 RX ADMIN — HYDROXYZINE PAMOATE PRN MG: 25 CAPSULE ORAL at 10:00

## 2022-06-05 RX ADMIN — Medication SCH TAB: at 10:01

## 2022-06-05 RX ADMIN — Medication SCH: at 21:34

## 2022-06-05 RX ADMIN — QUETIAPINE FUMARATE SCH: 100 TABLET ORAL at 21:34

## 2022-06-05 RX ADMIN — SERTRALINE HYDROCHLORIDE SCH MG: 50 TABLET ORAL at 10:00

## 2022-06-06 RX ADMIN — Medication SCH TAB: at 09:55

## 2022-06-06 RX ADMIN — Medication SCH: at 21:00

## 2022-06-06 RX ADMIN — SERTRALINE HYDROCHLORIDE SCH MG: 50 TABLET ORAL at 09:55

## 2022-06-06 RX ADMIN — HYDROXYZINE PAMOATE PRN MG: 25 CAPSULE ORAL at 09:55

## 2022-06-06 RX ADMIN — QUETIAPINE FUMARATE SCH MG: 100 TABLET ORAL at 21:00

## 2022-06-07 RX ADMIN — Medication SCH: at 21:00

## 2022-06-07 RX ADMIN — QUETIAPINE FUMARATE SCH MG: 100 TABLET ORAL at 21:00

## 2022-06-07 RX ADMIN — SERTRALINE HYDROCHLORIDE SCH MG: 50 TABLET ORAL at 09:45

## 2022-06-07 RX ADMIN — Medication SCH TAB: at 09:45

## 2022-06-08 RX ADMIN — SERTRALINE HYDROCHLORIDE SCH MG: 50 TABLET ORAL at 09:58

## 2022-06-08 RX ADMIN — Medication SCH: at 21:01

## 2022-06-08 RX ADMIN — QUETIAPINE FUMARATE SCH MG: 100 TABLET ORAL at 21:00

## 2022-06-08 RX ADMIN — HYDROXYZINE PAMOATE PRN MG: 25 CAPSULE ORAL at 09:58

## 2022-06-08 RX ADMIN — Medication SCH TAB: at 09:58

## 2022-06-09 RX ADMIN — Medication SCH: at 21:00

## 2022-06-09 RX ADMIN — SERTRALINE HYDROCHLORIDE SCH MG: 50 TABLET ORAL at 10:34

## 2022-06-09 RX ADMIN — Medication SCH TAB: at 10:34

## 2022-06-09 RX ADMIN — QUETIAPINE FUMARATE SCH MG: 100 TABLET ORAL at 21:00

## 2022-06-09 RX ADMIN — ACETAMINOPHEN PRN MG: 325 TABLET ORAL at 13:38

## 2022-06-10 RX ADMIN — Medication SCH: at 21:08

## 2022-06-10 RX ADMIN — QUETIAPINE FUMARATE SCH MG: 100 TABLET ORAL at 21:08

## 2022-06-10 RX ADMIN — SERTRALINE HYDROCHLORIDE SCH MG: 50 TABLET ORAL at 09:55

## 2022-06-10 RX ADMIN — Medication SCH TAB: at 09:55

## 2022-06-11 RX ADMIN — Medication SCH TAB: at 10:16

## 2022-06-11 RX ADMIN — SERTRALINE HYDROCHLORIDE SCH MG: 50 TABLET ORAL at 10:16

## 2022-06-11 RX ADMIN — Medication SCH: at 21:00

## 2022-06-11 RX ADMIN — QUETIAPINE FUMARATE SCH MG: 100 TABLET ORAL at 21:00

## 2022-06-12 RX ADMIN — SERTRALINE HYDROCHLORIDE SCH MG: 50 TABLET ORAL at 09:59

## 2022-06-12 RX ADMIN — Medication SCH: at 21:03

## 2022-06-12 RX ADMIN — QUETIAPINE FUMARATE SCH MG: 100 TABLET ORAL at 21:03

## 2022-06-12 RX ADMIN — Medication SCH TAB: at 09:59

## 2022-06-13 RX ADMIN — SERTRALINE HYDROCHLORIDE SCH MG: 50 TABLET ORAL at 10:41

## 2022-06-13 RX ADMIN — Medication SCH TAB: at 10:41

## 2022-06-13 RX ADMIN — Medication SCH: at 21:02

## 2022-06-13 RX ADMIN — QUETIAPINE FUMARATE SCH MG: 100 TABLET ORAL at 21:02

## 2022-06-13 RX ADMIN — HYDROXYZINE PAMOATE PRN MG: 25 CAPSULE ORAL at 10:41

## 2022-06-14 RX ADMIN — Medication SCH: at 21:00

## 2022-06-14 RX ADMIN — HYDROXYZINE PAMOATE PRN MG: 25 CAPSULE ORAL at 10:38

## 2022-06-14 RX ADMIN — Medication SCH TAB: at 10:39

## 2022-06-14 RX ADMIN — SERTRALINE HYDROCHLORIDE SCH MG: 50 TABLET ORAL at 10:39

## 2022-06-14 RX ADMIN — QUETIAPINE FUMARATE SCH MG: 100 TABLET ORAL at 21:00

## 2022-06-15 RX ADMIN — SERTRALINE HYDROCHLORIDE SCH MG: 50 TABLET ORAL at 10:43

## 2022-06-15 RX ADMIN — Medication SCH: at 21:02

## 2022-06-15 RX ADMIN — QUETIAPINE FUMARATE SCH MG: 100 TABLET ORAL at 21:02

## 2022-06-15 RX ADMIN — HYDROXYZINE PAMOATE PRN MG: 25 CAPSULE ORAL at 10:44

## 2022-06-15 RX ADMIN — Medication SCH TAB: at 10:43

## 2022-06-16 RX ADMIN — SERTRALINE HYDROCHLORIDE SCH MG: 50 TABLET ORAL at 10:45

## 2022-06-16 RX ADMIN — Medication SCH: at 21:01

## 2022-06-16 RX ADMIN — HYDROXYZINE PAMOATE PRN MG: 25 CAPSULE ORAL at 10:45

## 2022-06-16 RX ADMIN — Medication SCH TAB: at 10:45

## 2022-06-16 RX ADMIN — QUETIAPINE FUMARATE SCH MG: 100 TABLET ORAL at 21:01

## 2022-06-17 VITALS — SYSTOLIC BLOOD PRESSURE: 100 MMHG | DIASTOLIC BLOOD PRESSURE: 70 MMHG | HEART RATE: 84 BPM | TEMPERATURE: 97.8 F

## 2022-06-17 RX ADMIN — Medication SCH TAB: at 09:28

## 2022-06-17 RX ADMIN — HYDROXYZINE PAMOATE PRN MG: 25 CAPSULE ORAL at 09:28

## 2022-06-17 RX ADMIN — SERTRALINE HYDROCHLORIDE SCH MG: 50 TABLET ORAL at 09:28

## 2023-09-03 NOTE — PN
[facility]HS Progress Note (SOAP)


Subjective: 





pain left shoulder x1.5 weeks


Objective: 





12/12/17 14:42


 Vital Signs - 24 hr











  12/11/17 12/12/17 12/12/17





  16:09 03:43 06:42


 


Temperature 98.2 F  98.1 F


 


Pulse Rate 92 H  92 H


 


Respiratory 20 18 18





Rate   


 


Blood Pressure 104/70  101/59








 Laboratory Tests











  12/12/17





  06:17


 


POC Glucometer  87








labs reviewed from admission, tender on palpationleft shoulder muculatrue 

aroundscapula, no erythema full rom


Assessment: 





12/12/17 14:43


muscle strain/pain - naprosyn atc w protonix, flexeril neurontin None